# Patient Record
Sex: FEMALE | Race: BLACK OR AFRICAN AMERICAN | NOT HISPANIC OR LATINO
[De-identification: names, ages, dates, MRNs, and addresses within clinical notes are randomized per-mention and may not be internally consistent; named-entity substitution may affect disease eponyms.]

---

## 2018-02-15 ENCOUNTER — APPOINTMENT (OUTPATIENT)
Dept: PULMONOLOGY | Facility: CLINIC | Age: 68
End: 2018-02-15
Payer: MEDICARE

## 2018-02-15 VITALS
HEART RATE: 74 BPM | DIASTOLIC BLOOD PRESSURE: 82 MMHG | OXYGEN SATURATION: 97 % | SYSTOLIC BLOOD PRESSURE: 124 MMHG | BODY MASS INDEX: 31.28 KG/M2 | WEIGHT: 149 LBS | HEIGHT: 58 IN

## 2018-02-15 VITALS — RESPIRATION RATE: 16 BRPM

## 2018-02-15 DIAGNOSIS — Z86.39 PERSONAL HISTORY OF OTHER ENDOCRINE, NUTRITIONAL AND METABOLIC DISEASE: ICD-10-CM

## 2018-02-15 DIAGNOSIS — Z00.00 ENCOUNTER FOR GENERAL ADULT MEDICAL EXAMINATION W/OUT ABNORMAL FINDINGS: ICD-10-CM

## 2018-02-15 DIAGNOSIS — Z86.79 PERSONAL HISTORY OF OTHER DISEASES OF THE CIRCULATORY SYSTEM: ICD-10-CM

## 2018-02-15 DIAGNOSIS — Z87.39 PERSONAL HISTORY OF OTHER DISEASES OF THE MUSCULOSKELETAL SYSTEM AND CONNECTIVE TISSUE: ICD-10-CM

## 2018-02-15 PROCEDURE — 94727 GAS DIL/WSHOT DETER LNG VOL: CPT

## 2018-02-15 PROCEDURE — 99205 OFFICE O/P NEW HI 60 MIN: CPT | Mod: 25

## 2018-02-15 PROCEDURE — 94729 DIFFUSING CAPACITY: CPT

## 2018-02-15 PROCEDURE — 94060 EVALUATION OF WHEEZING: CPT

## 2018-02-15 PROCEDURE — 85018 HEMOGLOBIN: CPT | Mod: QW

## 2018-02-15 PROCEDURE — 94664 DEMO&/EVAL PT USE INHALER: CPT | Mod: 59

## 2018-02-15 RX ORDER — METHOTREXATE 2.5 MG/1
2.5 TABLET ORAL
Refills: 0 | Status: ACTIVE | COMMUNITY

## 2018-03-29 ENCOUNTER — APPOINTMENT (OUTPATIENT)
Dept: PULMONOLOGY | Facility: CLINIC | Age: 68
End: 2018-03-29
Payer: MEDICARE

## 2018-03-29 VITALS — HEART RATE: 75 BPM | SYSTOLIC BLOOD PRESSURE: 120 MMHG | DIASTOLIC BLOOD PRESSURE: 80 MMHG

## 2018-03-29 VITALS — WEIGHT: 152 LBS | BODY MASS INDEX: 31.77 KG/M2

## 2018-03-29 VITALS — OXYGEN SATURATION: 96 %

## 2018-03-29 DIAGNOSIS — R93.8 ABNORMAL FINDINGS ON DIAGNOSTIC IMAGING OF OTHER SPECIFIED BODY STRUCTURES: ICD-10-CM

## 2018-03-29 PROCEDURE — 94010 BREATHING CAPACITY TEST: CPT

## 2018-03-29 PROCEDURE — 99214 OFFICE O/P EST MOD 30 MIN: CPT | Mod: 25

## 2018-04-23 ENCOUNTER — RX RENEWAL (OUTPATIENT)
Age: 68
End: 2018-04-23

## 2018-06-30 ENCOUNTER — TRANSCRIPTION ENCOUNTER (OUTPATIENT)
Age: 68
End: 2018-06-30

## 2018-08-28 ENCOUNTER — EMERGENCY (EMERGENCY)
Facility: HOSPITAL | Age: 68
LOS: 1 days | Discharge: HOME | End: 2018-08-28
Attending: EMERGENCY MEDICINE
Payer: MEDICARE

## 2018-08-28 VITALS
DIASTOLIC BLOOD PRESSURE: 83 MMHG | HEART RATE: 75 BPM | SYSTOLIC BLOOD PRESSURE: 132 MMHG | TEMPERATURE: 98 F | RESPIRATION RATE: 16 BRPM | OXYGEN SATURATION: 95 %

## 2018-08-28 VITALS
RESPIRATION RATE: 16 BRPM | SYSTOLIC BLOOD PRESSURE: 128 MMHG | HEART RATE: 90 BPM | HEIGHT: 66 IN | DIASTOLIC BLOOD PRESSURE: 75 MMHG | WEIGHT: 151.9 LBS | OXYGEN SATURATION: 95 % | TEMPERATURE: 98 F

## 2018-08-28 DIAGNOSIS — Z98.89 OTHER SPECIFIED POSTPROCEDURAL STATES: Chronic | ICD-10-CM

## 2018-08-28 LAB
ALBUMIN SERPL ELPH-MCNC: 3.2 G/DL — LOW (ref 3.3–5)
ALP SERPL-CCNC: 105 U/L — SIGNIFICANT CHANGE UP (ref 40–120)
ALT FLD-CCNC: 21 U/L — SIGNIFICANT CHANGE UP (ref 12–78)
ANION GAP SERPL CALC-SCNC: 7 MMOL/L — SIGNIFICANT CHANGE UP (ref 5–17)
AST SERPL-CCNC: 23 U/L — SIGNIFICANT CHANGE UP (ref 15–37)
BASOPHILS # BLD AUTO: 0.08 K/UL — SIGNIFICANT CHANGE UP (ref 0–0.2)
BASOPHILS NFR BLD AUTO: 1.1 % — SIGNIFICANT CHANGE UP (ref 0–2)
BILIRUB SERPL-MCNC: 0.4 MG/DL — SIGNIFICANT CHANGE UP (ref 0.2–1.2)
BUN SERPL-MCNC: 18 MG/DL — SIGNIFICANT CHANGE UP (ref 7–23)
CALCIUM SERPL-MCNC: 9.2 MG/DL — SIGNIFICANT CHANGE UP (ref 8.5–10.1)
CHLORIDE SERPL-SCNC: 106 MMOL/L — SIGNIFICANT CHANGE UP (ref 96–108)
CK MB BLD-MCNC: 0.9 % — SIGNIFICANT CHANGE UP (ref 0–3.5)
CK MB CFR SERPL CALC: 1.2 NG/ML — SIGNIFICANT CHANGE UP (ref 0–3.6)
CK SERPL-CCNC: 127 U/L — SIGNIFICANT CHANGE UP (ref 26–192)
CO2 SERPL-SCNC: 27 MMOL/L — SIGNIFICANT CHANGE UP (ref 22–31)
CREAT SERPL-MCNC: 0.72 MG/DL — SIGNIFICANT CHANGE UP (ref 0.5–1.3)
D DIMER BLD IA.RAPID-MCNC: 195 NG/ML DDU — SIGNIFICANT CHANGE UP
EOSINOPHIL # BLD AUTO: 0.94 K/UL — HIGH (ref 0–0.5)
EOSINOPHIL NFR BLD AUTO: 12.7 % — HIGH (ref 0–6)
GLUCOSE SERPL-MCNC: 120 MG/DL — HIGH (ref 70–99)
HCT VFR BLD CALC: 41.8 % — SIGNIFICANT CHANGE UP (ref 34.5–45)
HGB BLD-MCNC: 14.3 G/DL — SIGNIFICANT CHANGE UP (ref 11.5–15.5)
IMM GRANULOCYTES NFR BLD AUTO: 0.1 % — SIGNIFICANT CHANGE UP (ref 0–1.5)
LYMPHOCYTES # BLD AUTO: 2.44 K/UL — SIGNIFICANT CHANGE UP (ref 1–3.3)
LYMPHOCYTES # BLD AUTO: 33 % — SIGNIFICANT CHANGE UP (ref 13–44)
MCHC RBC-ENTMCNC: 29.1 PG — SIGNIFICANT CHANGE UP (ref 27–34)
MCHC RBC-ENTMCNC: 34.2 GM/DL — SIGNIFICANT CHANGE UP (ref 32–36)
MCV RBC AUTO: 85 FL — SIGNIFICANT CHANGE UP (ref 80–100)
MONOCYTES # BLD AUTO: 0.59 K/UL — SIGNIFICANT CHANGE UP (ref 0–0.9)
MONOCYTES NFR BLD AUTO: 8 % — SIGNIFICANT CHANGE UP (ref 2–14)
NEUTROPHILS # BLD AUTO: 3.33 K/UL — SIGNIFICANT CHANGE UP (ref 1.8–7.4)
NEUTROPHILS NFR BLD AUTO: 45.1 % — SIGNIFICANT CHANGE UP (ref 43–77)
NRBC # BLD: 0 /100 WBCS — SIGNIFICANT CHANGE UP (ref 0–0)
PLATELET # BLD AUTO: 308 K/UL — SIGNIFICANT CHANGE UP (ref 150–400)
POTASSIUM SERPL-MCNC: 4.2 MMOL/L — SIGNIFICANT CHANGE UP (ref 3.5–5.3)
POTASSIUM SERPL-SCNC: 4.2 MMOL/L — SIGNIFICANT CHANGE UP (ref 3.5–5.3)
PROT SERPL-MCNC: 7.8 G/DL — SIGNIFICANT CHANGE UP (ref 6–8.3)
RBC # BLD: 4.92 M/UL — SIGNIFICANT CHANGE UP (ref 3.8–5.2)
RBC # FLD: 15.1 % — HIGH (ref 10.3–14.5)
SODIUM SERPL-SCNC: 140 MMOL/L — SIGNIFICANT CHANGE UP (ref 135–145)
TROPONIN I SERPL-MCNC: <.015 NG/ML — SIGNIFICANT CHANGE UP (ref 0.01–0.04)
WBC # BLD: 7.39 K/UL — SIGNIFICANT CHANGE UP (ref 3.8–10.5)
WBC # FLD AUTO: 7.39 K/UL — SIGNIFICANT CHANGE UP (ref 3.8–10.5)

## 2018-08-28 PROCEDURE — 96374 THER/PROPH/DIAG INJ IV PUSH: CPT

## 2018-08-28 PROCEDURE — 93010 ELECTROCARDIOGRAM REPORT: CPT

## 2018-08-28 PROCEDURE — 80053 COMPREHEN METABOLIC PANEL: CPT

## 2018-08-28 PROCEDURE — 85027 COMPLETE CBC AUTOMATED: CPT

## 2018-08-28 PROCEDURE — 85379 FIBRIN DEGRADATION QUANT: CPT

## 2018-08-28 PROCEDURE — 71046 X-RAY EXAM CHEST 2 VIEWS: CPT | Mod: 26

## 2018-08-28 PROCEDURE — 82553 CREATINE MB FRACTION: CPT

## 2018-08-28 PROCEDURE — 99285 EMERGENCY DEPT VISIT HI MDM: CPT

## 2018-08-28 PROCEDURE — 93005 ELECTROCARDIOGRAM TRACING: CPT

## 2018-08-28 PROCEDURE — 84484 ASSAY OF TROPONIN QUANT: CPT

## 2018-08-28 PROCEDURE — 99284 EMERGENCY DEPT VISIT MOD MDM: CPT | Mod: 25

## 2018-08-28 PROCEDURE — 82550 ASSAY OF CK (CPK): CPT

## 2018-08-28 PROCEDURE — 71046 X-RAY EXAM CHEST 2 VIEWS: CPT

## 2018-08-28 RX ORDER — KETOROLAC TROMETHAMINE 30 MG/ML
30 SYRINGE (ML) INJECTION ONCE
Qty: 0 | Refills: 0 | Status: DISCONTINUED | OUTPATIENT
Start: 2018-08-28 | End: 2018-08-28

## 2018-08-28 RX ORDER — ASPIRIN/CALCIUM CARB/MAGNESIUM 324 MG
325 TABLET ORAL ONCE
Qty: 0 | Refills: 0 | Status: COMPLETED | OUTPATIENT
Start: 2018-08-28 | End: 2018-08-28

## 2018-08-28 RX ORDER — SODIUM CHLORIDE 9 MG/ML
3 INJECTION INTRAMUSCULAR; INTRAVENOUS; SUBCUTANEOUS ONCE
Qty: 0 | Refills: 0 | Status: COMPLETED | OUTPATIENT
Start: 2018-08-28 | End: 2018-08-28

## 2018-08-28 RX ADMIN — Medication 30 MILLIGRAM(S): at 02:31

## 2018-08-28 RX ADMIN — Medication 30 MILLIGRAM(S): at 02:52

## 2018-08-28 RX ADMIN — SODIUM CHLORIDE 3 MILLILITER(S): 9 INJECTION INTRAMUSCULAR; INTRAVENOUS; SUBCUTANEOUS at 01:38

## 2018-08-28 RX ADMIN — Medication 325 MILLIGRAM(S): at 01:09

## 2018-08-28 NOTE — ED PROVIDER NOTE - OBJECTIVE STATEMENT
67yo female with chest pain. pt was sitting at home and developed sudden onset of chest pain, midsternal, non radiating, tight, worse with coughing, no sob, no dizziness or diaphoresis

## 2018-08-28 NOTE — ED PROVIDER NOTE - PROGRESS NOTE DETAILS
pt states that she has had this pain before, but it was controlled with methotrexate, but that was stopped due to a toe infection, so now pt is having pain which is not new. pt to get toradol, follow up with pmd, return if any sytmposm worsen

## 2018-08-28 NOTE — ED ADULT NURSE NOTE - OBJECTIVE STATEMENT
Patient has a chronic cough for many years. Tonight she reports it has become worse and c/o 6/10 pain on palpation of area just below trachea. Mild tenderness notes to area, no crepitus.

## 2019-11-20 ENCOUNTER — APPOINTMENT (OUTPATIENT)
Dept: VASCULAR SURGERY | Facility: CLINIC | Age: 69
End: 2019-11-20
Payer: MEDICARE

## 2019-11-20 VITALS
WEIGHT: 152 LBS | OXYGEN SATURATION: 96 % | DIASTOLIC BLOOD PRESSURE: 93 MMHG | BODY MASS INDEX: 30.64 KG/M2 | HEART RATE: 72 BPM | HEIGHT: 59 IN | RESPIRATION RATE: 16 BRPM | SYSTOLIC BLOOD PRESSURE: 151 MMHG

## 2019-11-20 DIAGNOSIS — Z82.49 FAMILY HISTORY OF ISCHEMIC HEART DISEASE AND OTHER DISEASES OF THE CIRCULATORY SYSTEM: ICD-10-CM

## 2019-11-20 DIAGNOSIS — Z86.39 PERSONAL HISTORY OF OTHER ENDOCRINE, NUTRITIONAL AND METABOLIC DISEASE: ICD-10-CM

## 2019-11-20 DIAGNOSIS — Z86.79 PERSONAL HISTORY OF OTHER DISEASES OF THE CIRCULATORY SYSTEM: ICD-10-CM

## 2019-11-20 DIAGNOSIS — Z82.61 FAMILY HISTORY OF ARTHRITIS: ICD-10-CM

## 2019-11-20 DIAGNOSIS — Z87.39 PERSONAL HISTORY OF OTHER DISEASES OF THE MUSCULOSKELETAL SYSTEM AND CONNECTIVE TISSUE: ICD-10-CM

## 2019-11-20 PROCEDURE — 99203 OFFICE O/P NEW LOW 30 MIN: CPT

## 2019-11-20 PROCEDURE — 93923 UPR/LXTR ART STDY 3+ LVLS: CPT

## 2019-11-25 PROBLEM — Z87.39 HISTORY OF ARTHRITIS: Status: RESOLVED | Noted: 2019-11-21 | Resolved: 2019-11-25

## 2019-11-25 PROBLEM — Z82.49 FAMILY HISTORY OF HYPERTENSION: Status: ACTIVE | Noted: 2019-11-21

## 2019-11-25 PROBLEM — Z82.61 FAMILY HISTORY OF RHEUMATOID ARTHRITIS: Status: ACTIVE | Noted: 2019-11-21

## 2019-11-25 PROBLEM — Z86.39 HISTORY OF HIGH CHOLESTEROL: Status: RESOLVED | Noted: 2019-11-21 | Resolved: 2019-11-25

## 2019-11-25 PROBLEM — Z86.79 HISTORY OF HYPERTENSION: Status: RESOLVED | Noted: 2019-11-21 | Resolved: 2019-11-25

## 2019-11-25 PROBLEM — Z82.49 FAMILY HISTORY OF MYOCARDIAL INFARCTION: Status: ACTIVE | Noted: 2019-11-21

## 2019-11-25 RX ORDER — FOLIC ACID 1 MG/1
1 TABLET ORAL
Refills: 0 | Status: ACTIVE | COMMUNITY

## 2019-11-29 ENCOUNTER — OUTPATIENT (OUTPATIENT)
Dept: OUTPATIENT SERVICES | Facility: HOSPITAL | Age: 69
LOS: 1 days | End: 2019-11-29
Payer: COMMERCIAL

## 2019-11-29 VITALS
TEMPERATURE: 98 F | DIASTOLIC BLOOD PRESSURE: 87 MMHG | HEIGHT: 60 IN | RESPIRATION RATE: 18 BRPM | HEART RATE: 74 BPM | SYSTOLIC BLOOD PRESSURE: 143 MMHG | OXYGEN SATURATION: 96 % | WEIGHT: 160.94 LBS

## 2019-11-29 DIAGNOSIS — Z98.891 HISTORY OF UTERINE SCAR FROM PREVIOUS SURGERY: Chronic | ICD-10-CM

## 2019-11-29 DIAGNOSIS — L97.509 NON-PRESSURE CHRONIC ULCER OF OTHER PART OF UNSPECIFIED FOOT WITH UNSPECIFIED SEVERITY: ICD-10-CM

## 2019-11-29 DIAGNOSIS — Z01.818 ENCOUNTER FOR OTHER PREPROCEDURAL EXAMINATION: ICD-10-CM

## 2019-11-29 DIAGNOSIS — Z98.89 OTHER SPECIFIED POSTPROCEDURAL STATES: Chronic | ICD-10-CM

## 2019-11-29 DIAGNOSIS — Z96.651 PRESENCE OF RIGHT ARTIFICIAL KNEE JOINT: Chronic | ICD-10-CM

## 2019-11-29 LAB
ANION GAP SERPL CALC-SCNC: 13 MMOL/L — SIGNIFICANT CHANGE UP (ref 5–17)
APTT BLD: 30.3 SEC — SIGNIFICANT CHANGE UP (ref 27.5–36.3)
BUN SERPL-MCNC: 17 MG/DL — SIGNIFICANT CHANGE UP (ref 8–20)
CALCIUM SERPL-MCNC: 9.2 MG/DL — SIGNIFICANT CHANGE UP (ref 8.6–10.2)
CHLORIDE SERPL-SCNC: 102 MMOL/L — SIGNIFICANT CHANGE UP (ref 98–107)
CO2 SERPL-SCNC: 24 MMOL/L — SIGNIFICANT CHANGE UP (ref 22–29)
CREAT SERPL-MCNC: 0.68 MG/DL — SIGNIFICANT CHANGE UP (ref 0.5–1.3)
GLUCOSE SERPL-MCNC: 104 MG/DL — SIGNIFICANT CHANGE UP (ref 70–115)
HCT VFR BLD CALC: 44.8 % — SIGNIFICANT CHANGE UP (ref 39–50)
HGB BLD-MCNC: 14.2 G/DL — SIGNIFICANT CHANGE UP (ref 13–17)
INR BLD: 1.06 RATIO — SIGNIFICANT CHANGE UP (ref 0.88–1.16)
MAGNESIUM SERPL-MCNC: 1.8 MG/DL — SIGNIFICANT CHANGE UP (ref 1.6–2.6)
MCHC RBC-ENTMCNC: 28.3 PG — SIGNIFICANT CHANGE UP (ref 27–34)
MCHC RBC-ENTMCNC: 31.7 GM/DL — LOW (ref 32–36)
MCV RBC AUTO: 89.2 FL — SIGNIFICANT CHANGE UP (ref 80–100)
PLATELET # BLD AUTO: 367 K/UL — SIGNIFICANT CHANGE UP (ref 150–400)
POTASSIUM SERPL-MCNC: 4.5 MMOL/L — SIGNIFICANT CHANGE UP (ref 3.5–5.3)
POTASSIUM SERPL-SCNC: 4.5 MMOL/L — SIGNIFICANT CHANGE UP (ref 3.5–5.3)
PROTHROM AB SERPL-ACNC: 12.2 SEC — SIGNIFICANT CHANGE UP (ref 10–12.9)
RBC # BLD: 5.02 M/UL — SIGNIFICANT CHANGE UP (ref 4.2–5.8)
RBC # FLD: 15 % — HIGH (ref 10.3–14.5)
SODIUM SERPL-SCNC: 139 MMOL/L — SIGNIFICANT CHANGE UP (ref 135–145)
WBC # BLD: 6.85 K/UL — SIGNIFICANT CHANGE UP (ref 3.8–10.5)
WBC # FLD AUTO: 6.85 K/UL — SIGNIFICANT CHANGE UP (ref 3.8–10.5)

## 2019-11-29 PROCEDURE — 85730 THROMBOPLASTIN TIME PARTIAL: CPT

## 2019-11-29 PROCEDURE — G0463: CPT

## 2019-11-29 PROCEDURE — 85610 PROTHROMBIN TIME: CPT

## 2019-11-29 PROCEDURE — 93010 ELECTROCARDIOGRAM REPORT: CPT

## 2019-11-29 PROCEDURE — 85027 COMPLETE CBC AUTOMATED: CPT

## 2019-11-29 PROCEDURE — 80048 BASIC METABOLIC PNL TOTAL CA: CPT

## 2019-11-29 PROCEDURE — 93005 ELECTROCARDIOGRAM TRACING: CPT

## 2019-11-29 PROCEDURE — 36415 COLL VENOUS BLD VENIPUNCTURE: CPT

## 2019-11-29 PROCEDURE — 83735 ASSAY OF MAGNESIUM: CPT

## 2019-11-29 NOTE — H&P PST ADULT - OTHER CARE PROVIDERS
Dr. Richardson ([facility] Cardiologist), Dr. Dennis Becerra (Vascular Surgeon) Dr. Richardson ([facility] Cardiologist), Dr. Dennis Becerra (Vascular Surgeon), Dr. Clark (Podiatry)

## 2019-11-29 NOTE — H&P PST ADULT - HISTORY OF PRESENT ILLNESS
Narrative: This is a 70 y/o F, never smoker, with a PMHx of HTN, HLD, Bronchiectasis s/p 9/11 exposure, PAD, RA (on methotrexate and prednisone) with a nonhealing right great toe ulcer presents today to PST in anticipation of a right lower extremity angiogram with Dr. Walter. She reports her ulcer has been present for >5 months and has been treated per podiatry and wound care.  She has also had a previous RLE angiogram with Dr. Griggs; there was no intervention.  She is complaining of significant toe pain at the site of her ulcer and endorses intermittent claudication bilaterally.   She denies any history of diabetes.  She endorses daily compliance with asa, statin and antihypertensives.     Lower arterial Exam 11/20/2019  MANDI/PVR demonstrated R MANDI 0.62 and TBI 0.33 with toe pressure of 51 (ankle pressure of 96)  L MANDI 0.65 and TBI 0.22 with toe pressure of 34  Impression: blunted waveforms with monophasic appearance noted in the lower extremity. Moderate decrease in pressure and abnormal ankle brachial index suggestive of arterial occlusive disease consistent with arterial claudication.    ASA: 2  Mallampati:  Bleeding Risk:  Creatinine:  GFR: Narrative: This is a 70 y/o F, never smoker, with a PMHx of HTN, HLD, Bronchiectasis s/p 9/11 exposure, PAD, RA (on methotrexate) with a nonhealing right great toe ulcer presents today to PST in anticipation of a right lower extremity angiogram with Dr. Walter. She reports her ulcer has been present for >5 months and has been treated per podiatry and wound care.  She has also had a previous RLE angiogram with Dr. Griggs; there was no intervention.  She is complaining of significant toe pain at the site of her ulcer and endorses intermittent claudication bilaterally.   She denies any history of diabetes; reports her last hemoglobin a1c was normal and was done at her cardiologist's office.  She endorses daily compliance with asa, statin and antihypertensives.     Lower arterial Exam 11/20/2019  MANDI/PVR demonstrated R MANDI 0.62 and TBI 0.33 with toe pressure of 51 (ankle pressure of 96)  L MANDI 0.65 and TBI 0.22 with toe pressure of 34  Impression: blunted waveforms with monophasic appearance noted in the lower extremity. Moderate decrease in pressure and abnormal ankle brachial index suggestive of arterial occlusive disease consistent with arterial claudication.    ASA: 2  Mallampati: 2  Bleeding Risk: 1.6%  Creatinine: 0.68  GFR: 114 Narrative: This is a 68 y/o F, never smoker, with a PMHx of HTN, HLD, Bronchiectasis s/p 9/11 exposure, PAD, RA (on methotrexate) with a nonhealing right great toe ulcer presents today to PST in anticipation of a right lower extremity angiogram with Dr. Walter. She reports her ulcer has been present for >5 months and has been treated per podiatry and wound care.  She has also had a previous RLE angiogram with Dr. Griggs (report requested); she had a POBA (unknown vessel) per pt and daughter (Dr. Patricia Carvajal).  She is complaining of significant toe pain at the site of her ulcer and endorses intermittent claudication bilaterally.   She denies any history of diabetes; reports her last hemoglobin a1c was normal and was done at her cardiologist's office.  She endorses daily compliance with asa, statin and antihypertensives.     Lower arterial Exam 11/20/2019  MANDI/PVR demonstrated R MANDI 0.62 and TBI 0.33 with toe pressure of 51 (ankle pressure of 96)  L MANDI 0.65 and TBI 0.22 with toe pressure of 34  Impression: blunted waveforms with monophasic appearance noted in the lower extremity. Moderate decrease in pressure and abnormal ankle brachial index suggestive of arterial occlusive disease consistent with arterial claudication.    ASA: 2  Mallampati: 2  Bleeding Risk: 1.6%  Creatinine: 0.68  GFR: 114 Narrative: This is a 68 y/o F, never smoker, with a PMHx of HTN, HLD, Bronchiectasis s/p 9/11 exposure, PAD, RA (on methotrexate) with a nonhealing right great toe ulcer presents today to PST in anticipation of a right lower extremity angiogram with Dr. Walter. She reports her ulcer has been present for >5 months and has been treated per podiatry and wound care (iodoform and other ointment; she does not recall).  She has also had a previous RLE angiogram with Dr. Griggs (report requested); she had a POBA (unknown vessel) per pt and daughter (Dr. Patricia Carvajal).  She is complaining of significant toe pain at the site of her ulcer and endorses intermittent claudication bilaterally.   She denies any history of diabetes; reports her last hemoglobin a1c was normal and was done at her cardiologist's office.  She endorses daily compliance with asa, statin and antihypertensives.     Lower arterial Exam 11/20/2019  MANDI/PVR demonstrated R MANDI 0.62 and TBI 0.33 with toe pressure of 51 (ankle pressure of 96)  L MANDI 0.65 and TBI 0.22 with toe pressure of 34  Impression: blunted waveforms with monophasic appearance noted in the lower extremity. Moderate decrease in pressure and abnormal ankle brachial index suggestive of arterial occlusive disease consistent with arterial claudication.    ASA: 2  Mallampati: 2  Bleeding Risk: 1.6%  Creatinine: 0.68  GFR: 114

## 2019-11-29 NOTE — H&P PST ADULT - NEGATIVE NEUROLOGICAL SYMPTOMS
no syncope/no headache/no weakness/no paresthesias/no generalized seizures/no loss of sensation/no transient paralysis/no focal seizures/no tremors/no vertigo

## 2019-11-29 NOTE — ASU PATIENT PROFILE, ADULT - PMH
Chronic ulcer of toe  right great toe  Claudication    H/O bronchiectasis    HLD (hyperlipidemia)    HTN (hypertension)    PAD (peripheral artery disease)    Rheumatoid arthritis

## 2019-11-29 NOTE — H&P PST ADULT - ASSESSMENT
70 y/o F with a PMHx HTN, HLD, RA with nonhealing R toe ulcer presents in anticipation of a RLE angiogram with possible intervention to treat critical limb ischemia.

## 2019-11-29 NOTE — H&P PST ADULT - NSICDXPASTMEDICALHX_GEN_ALL_CORE_FT
PAST MEDICAL HISTORY:  Chronic ulcer of toe right great toe    Claudication     H/O bronchiectasis     HLD (hyperlipidemia)     HTN (hypertension)     PAD (peripheral artery disease)     Rheumatoid arthritis

## 2019-12-05 ENCOUNTER — TRANSCRIPTION ENCOUNTER (OUTPATIENT)
Age: 69
End: 2019-12-05

## 2019-12-05 ENCOUNTER — OUTPATIENT (OUTPATIENT)
Dept: OUTPATIENT SERVICES | Facility: HOSPITAL | Age: 69
LOS: 1 days | End: 2019-12-05
Payer: COMMERCIAL

## 2019-12-05 VITALS
SYSTOLIC BLOOD PRESSURE: 126 MMHG | DIASTOLIC BLOOD PRESSURE: 79 MMHG | OXYGEN SATURATION: 98 % | RESPIRATION RATE: 16 BRPM | HEART RATE: 79 BPM

## 2019-12-05 VITALS
DIASTOLIC BLOOD PRESSURE: 67 MMHG | HEART RATE: 80 BPM | RESPIRATION RATE: 17 BRPM | OXYGEN SATURATION: 95 % | SYSTOLIC BLOOD PRESSURE: 117 MMHG

## 2019-12-05 DIAGNOSIS — Z98.89 OTHER SPECIFIED POSTPROCEDURAL STATES: Chronic | ICD-10-CM

## 2019-12-05 DIAGNOSIS — Z96.651 PRESENCE OF RIGHT ARTIFICIAL KNEE JOINT: Chronic | ICD-10-CM

## 2019-12-05 DIAGNOSIS — R07.9 CHEST PAIN, UNSPECIFIED: ICD-10-CM

## 2019-12-05 DIAGNOSIS — Z98.891 HISTORY OF UTERINE SCAR FROM PREVIOUS SURGERY: Chronic | ICD-10-CM

## 2019-12-05 DIAGNOSIS — R06.9 UNSPECIFIED ABNORMALITIES OF BREATHING: ICD-10-CM

## 2019-12-05 PROCEDURE — 75710 ARTERY X-RAYS ARM/LEG: CPT | Mod: XU

## 2019-12-05 PROCEDURE — C1725: CPT

## 2019-12-05 PROCEDURE — 99152 MOD SED SAME PHYS/QHP 5/>YRS: CPT

## 2019-12-05 PROCEDURE — 37230: CPT

## 2019-12-05 PROCEDURE — C1760: CPT

## 2019-12-05 PROCEDURE — 36247 INS CATH ABD/L-EXT ART 3RD: CPT | Mod: 59

## 2019-12-05 PROCEDURE — C1887: CPT

## 2019-12-05 PROCEDURE — 76937 US GUIDE VASCULAR ACCESS: CPT | Mod: 26

## 2019-12-05 PROCEDURE — C1874: CPT

## 2019-12-05 PROCEDURE — 37230: CPT | Mod: RT

## 2019-12-05 PROCEDURE — 75625 CONTRAST EXAM ABDOMINL AORTA: CPT | Mod: 26

## 2019-12-05 PROCEDURE — 99153 MOD SED SAME PHYS/QHP EA: CPT

## 2019-12-05 PROCEDURE — C1894: CPT

## 2019-12-05 PROCEDURE — 75710 ARTERY X-RAYS ARM/LEG: CPT | Mod: 26,59

## 2019-12-05 PROCEDURE — C1769: CPT

## 2019-12-05 RX ORDER — ASPIRIN/CALCIUM CARB/MAGNESIUM 324 MG
81 TABLET ORAL ONCE
Refills: 0 | Status: DISCONTINUED | OUTPATIENT
Start: 2019-12-05 | End: 2019-12-30

## 2019-12-05 RX ORDER — CLOPIDOGREL BISULFATE 75 MG/1
1 TABLET, FILM COATED ORAL
Qty: 90 | Refills: 0
Start: 2019-12-05

## 2019-12-05 RX ORDER — SODIUM CHLORIDE 9 MG/ML
1000 INJECTION INTRAMUSCULAR; INTRAVENOUS; SUBCUTANEOUS
Refills: 0 | Status: DISCONTINUED | OUTPATIENT
Start: 2019-12-05 | End: 2019-12-30

## 2019-12-05 RX ORDER — ACETAMINOPHEN 500 MG
1000 TABLET ORAL ONCE
Refills: 0 | Status: COMPLETED | OUTPATIENT
Start: 2019-12-05 | End: 2019-12-05

## 2019-12-05 RX ORDER — CLOPIDOGREL BISULFATE 75 MG/1
75 TABLET, FILM COATED ORAL DAILY
Refills: 0 | Status: DISCONTINUED | OUTPATIENT
Start: 2019-12-06 | End: 2019-12-30

## 2019-12-05 RX ADMIN — Medication 400 MILLIGRAM(S): at 20:06

## 2019-12-05 NOTE — DISCHARGE NOTE PROVIDER - NSDCCPTREATMENT_GEN_ALL_CORE_FT
PRINCIPAL PROCEDURE  Procedure: Angioplasty, artery, peripheral  Findings and Treatment: stent to tib peroneal trunk

## 2019-12-05 NOTE — BRIEF OPERATIVE NOTE - NSICDXBRIEFPROCEDURE_GEN_ALL_CORE_FT
PROCEDURES:  Angio fluoro tibial artery using contrast w insertion of stent 05-Dec-2019 16:43:17  Juan Pablo Walden

## 2019-12-05 NOTE — DISCHARGE NOTE PROVIDER - HOSPITAL COURSE
70 y/o F, never smoker, with a PMHx of HTN, HLD, Bronchiectasis s/p 9/11 exposure, PAD, RA (on methotrexate) with a nonhealing right great toe ulcer presents today to PST in anticipation of a right lower extremity angiogram with Dr. Walter. She reports her ulcer has been present for >5 months and has been treated per podiatry and wound care (iodoform and other ointment; she does not recall).  She has also had a previous RLE angiogram with Dr. Griggs (report requested); she had a POBA (unknown vessel) per pt and daughter (Dr. Wellsica Alena).  She is complaining of significant toe pain at the site of her ulcer and endorses intermittent claudication bilaterally. Now S/P LE peripheral angiography Right tibial peroneal trunk stent, via LFA with mynx closure device and right pedal access with band applied. Needs bypass to PT in future.             Neuro: A&OX3    Lungs: CTA B/L    CV: S1, S2, no murmur, RRR    Abd: Soft    left Groin: Soft, no bleeding, no hematoma    Right pedal no bleeding, no hematoma, no ecchymosis    Extremity: + distal pulses            A/P: 69y female s/p LE peripheral angio with stent to tib peroneal trunk     1. Groin management discussed with patient    2. Continue current meds    3. Follow up as an outpatient with cardiologist    4. Add Plavix 75mg po daily    5, Bedrest x 4 hours    6. Discharge today if groin and pedal access site stable

## 2019-12-05 NOTE — DISCHARGE NOTE PROVIDER - NSDCCPCAREPLAN_GEN_ALL_CORE_FT
PRINCIPAL DISCHARGE DIAGNOSIS  Diagnosis: Peripheral arterial disease  Assessment and Plan of Treatment: S/P tib peroneal trunk stent

## 2019-12-05 NOTE — DISCHARGE NOTE PROVIDER - NSDCACTIVITY_GEN_ALL_CORE
Do not drive or operate machinery/Walking - Outdoors allowed/Walking - Indoors allowed/No heavy lifting/straining/Showering allowed

## 2019-12-05 NOTE — DISCHARGE NOTE NURSING/CASE MANAGEMENT/SOCIAL WORK - PATIENT PORTAL LINK FT
You can access the FollowMyHealth Patient Portal offered by [facility] by registering at the following website: [email or web address]. By joining [facility]’s FollowMyHealth portal, you will also be able to view your health information using other applications (apps) compatible with our system.

## 2019-12-05 NOTE — PROGRESS NOTE ADULT - SUBJECTIVE AND OBJECTIVE BOX
Progress Note Adult-Cardiology NP [Charted Location: [facility] Cath Lab] [Authored: 05-Dec-2019 09:48]- for Visit: [id number], Complete, Entered, Signed in Full, General    Progress Note:   · Provider Specialty	Cardiology      · Subjective and Objective:   Interventional Cardiology NP Precath Note      HPI: 68 y/o F, never smoker, with a PMHx of HTN, HLD, Bronchiectasis s/p  exposure, PAD, RA (on methotrexate) with a nonhealing right great toe ulcer presents today to PST in anticipation of a right lower extremity angiogram with Dr. Walter. She reports her ulcer has been present for >5 months and has been treated per podiatry and wound care (iodoform and other ointment; she does not recall).  She has also had a previous RLE angiogram with Dr. Griggs (report requested); she had a POBA (unknown vessel) per pt and daughter (Dr. Patricia Carvajal).  She is complaining of significant toe pain at the site of her ulcer and endorses intermittent claudication bilaterally.   She denies any history of diabetes; reports her last hemoglobin a1c was normal and was done at her cardiologist's office.  She endorses daily compliance with asa, statin and antihypertensives.           ALL: NKDA, NKFA. Denies shellfish/Contrast dye allergy.  PAST MEDICAL & SURGICAL HISTORY:  Claudication  H/O bronchiectasis  HLD (hyperlipidemia)  HTN (hypertension)  Chronic ulcer of toe: right great toe  PAD (peripheral artery disease)  Rheumatoid arthritis  S/P : x2  S/P total knee replacement, right:     SocHX: Denies EtoH/TOB/IVDU    MEDS:   aspirin  chewable 81 milliGRAM(s) Oral once    T(C): --  HR: 79 (19 @ 08:35) (79 - 79)  BP: 126/79 (19 @ 08:35) (126/79 - 126/79)  RR: 16 (19 @ 08:35) (16 - 16)  SpO2: 98% (19 @ 08:35) (98% - 98%)  Wt(kg): --      			  A/P:  68 yo female with claudication for LE Angio  -Proceed with procedure as planned	        Risks & benefits of procedure and sedation and risks and benefits for the alternative therapy have been explained to the patient in layman’s terms including but not limited to: allergic reaction, bleeding, infection, arrhythmia, respiratory compromise, renal and vascular compromise, limb damage, MI, CVA, emergent CABG/Vascular Surgery and death. Informed consent obtained and in chart by MD.      Electronic Signatures:  Courtney Patton ([email or web address])  (Signed 05-Dec-2019 09:55)  	Authored: Progress Note, Subjective and Objective      Last Updated: 05-Dec-2019 09:55 by Courtney Patton ([email or web address])

## 2019-12-05 NOTE — PROGRESS NOTE ADULT - SUBJECTIVE AND OBJECTIVE BOX
Interventional Cardiology NP Precath Note      HPI: 70 y/o F, never smoker, with a PMHx of HTN, HLD, Bronchiectasis s/p  exposure, PAD, RA (on methotrexate) with a nonhealing right great toe ulcer presents today to PST in anticipation of a right lower extremity angiogram with Dr. Walter. She reports her ulcer has been present for >5 months and has been treated per podiatry and wound care (iodoform and other ointment; she does not recall).  She has also had a previous RLE angiogram with Dr. Griggs (report requested); she had a POBA (unknown vessel) per pt and daughter (Dr. Patricia Carvajal).  She is complaining of significant toe pain at the site of her ulcer and endorses intermittent claudication bilaterally.   She denies any history of diabetes; reports her last hemoglobin a1c was normal and was done at her cardiologist's office.  She endorses daily compliance with asa, statin and antihypertensives.           ALL: NKDA, NKFA. Denies shellfish/Contrast dye allergy.  PAST MEDICAL & SURGICAL HISTORY:  Claudication  H/O bronchiectasis  HLD (hyperlipidemia)  HTN (hypertension)  Chronic ulcer of toe: right great toe  PAD (peripheral artery disease)  Rheumatoid arthritis  S/P : x2  S/P total knee replacement, right:     SocHX: Denies EtoH/TOB/IVDU    MEDS:   aspirin  chewable 81 milliGRAM(s) Oral once    T(C): --  HR: 79 (19 @ 08:35) (79 - 79)  BP: 126/79 (19 @ 08:35) (126/79 - 126/79)  RR: 16 (19 @ 08:35) (16 - 16)  SpO2: 98% (19 @ 08:35) (98% - 98%)  Wt(kg): --      			  A/P:  68 yo female with claudication for LE Angio  -Proceed with procedure as planned	        Risks & benefits of procedure and sedation and risks and benefits for the alternative therapy have been explained to the patient in layman’s terms including but not limited to: allergic reaction, bleeding, infection, arrhythmia, respiratory compromise, renal and vascular compromise, limb damage, MI, CVA, emergent CABG/Vascular Surgery and death. Informed consent obtained and in chart by MD.

## 2019-12-05 NOTE — DISCHARGE NOTE PROVIDER - CARE PROVIDER_API CALL
ManvarSingh, Pallavi B (MD)  Vascular Surgery  [address], [address] [place] [address]  Phone: [phone]  Fax: [phone]  Follow Up Time: 2 weeks

## 2019-12-05 NOTE — DISCHARGE NOTE PROVIDER - NSDCMRMEDTOKEN_GEN_ALL_CORE_FT
amLODIPine 5 mg oral tablet: 1 tab(s) orally once a day  aspirin 81 mg oral tablet: 1 tab(s) orally once a day  Calcium 600+D oral tablet: 1 tab(s) orally once a day  clopidogrel 75 mg oral tablet: 1 tab(s) orally once a day  Cod Liver Oil oral capsule: 1 cap(s) orally 2 times a day  Fish Oil 1000 mg oral capsule: 1 cap(s) orally once a day  folic acid 1 mg oral tablet: 1 tab(s) orally once a day  ibuprofen 200 mg oral tablet: 1 tab(s) orally once a day, As Needed  losartan-hydrochlorothiazide 50mg-12.5mg oral tablet: 1 tab(s) orally once a day  methotrexate 2.5 mg oral tablet: 6 tab(s) orally every 7 days  Multiple Vitamins oral tablet: 1 tab(s) orally once a day  Protonix 40 mg oral delayed release tablet: 1 tab(s) orally once a day  simvastatin 40 mg oral tablet: 1 tab(s) orally once a day (at bedtime)  Vitamin D3 1000 intl units oral tablet: 1 tab(s) orally once a day

## 2019-12-20 ENCOUNTER — APPOINTMENT (OUTPATIENT)
Dept: VASCULAR SURGERY | Facility: CLINIC | Age: 69
End: 2019-12-20
Payer: MEDICARE

## 2019-12-20 ENCOUNTER — OUTPATIENT (OUTPATIENT)
Dept: OUTPATIENT SERVICES | Facility: HOSPITAL | Age: 69
LOS: 1 days | End: 2019-12-20
Payer: COMMERCIAL

## 2019-12-20 VITALS
DIASTOLIC BLOOD PRESSURE: 90 MMHG | SYSTOLIC BLOOD PRESSURE: 130 MMHG | RESPIRATION RATE: 18 BRPM | HEART RATE: 60 BPM | WEIGHT: 162.04 LBS | HEIGHT: 56 IN | TEMPERATURE: 98 F

## 2019-12-20 DIAGNOSIS — Z01.818 ENCOUNTER FOR OTHER PREPROCEDURAL EXAMINATION: ICD-10-CM

## 2019-12-20 DIAGNOSIS — Z29.9 ENCOUNTER FOR PROPHYLACTIC MEASURES, UNSPECIFIED: ICD-10-CM

## 2019-12-20 DIAGNOSIS — I73.9 PERIPHERAL VASCULAR DISEASE, UNSPECIFIED: ICD-10-CM

## 2019-12-20 DIAGNOSIS — Z98.891 HISTORY OF UTERINE SCAR FROM PREVIOUS SURGERY: Chronic | ICD-10-CM

## 2019-12-20 DIAGNOSIS — Z98.89 OTHER SPECIFIED POSTPROCEDURAL STATES: Chronic | ICD-10-CM

## 2019-12-20 DIAGNOSIS — Z96.651 PRESENCE OF RIGHT ARTIFICIAL KNEE JOINT: Chronic | ICD-10-CM

## 2019-12-20 LAB
ANION GAP SERPL CALC-SCNC: 12 MMOL/L — SIGNIFICANT CHANGE UP (ref 5–17)
APTT BLD: 31.3 SEC — SIGNIFICANT CHANGE UP (ref 27.5–36.3)
BASOPHILS # BLD AUTO: 0.06 K/UL — SIGNIFICANT CHANGE UP (ref 0–0.2)
BASOPHILS NFR BLD AUTO: 1 % — SIGNIFICANT CHANGE UP (ref 0–2)
BLD GP AB SCN SERPL QL: SIGNIFICANT CHANGE UP
BUN SERPL-MCNC: 13 MG/DL — SIGNIFICANT CHANGE UP (ref 8–20)
CALCIUM SERPL-MCNC: 9.8 MG/DL — SIGNIFICANT CHANGE UP (ref 8.6–10.2)
CHLORIDE SERPL-SCNC: 99 MMOL/L — SIGNIFICANT CHANGE UP (ref 98–107)
CO2 SERPL-SCNC: 28 MMOL/L — SIGNIFICANT CHANGE UP (ref 22–29)
CREAT SERPL-MCNC: 0.53 MG/DL — SIGNIFICANT CHANGE UP (ref 0.5–1.3)
EOSINOPHIL # BLD AUTO: 0.25 K/UL — SIGNIFICANT CHANGE UP (ref 0–0.5)
EOSINOPHIL NFR BLD AUTO: 4.3 % — SIGNIFICANT CHANGE UP (ref 0–6)
GLUCOSE SERPL-MCNC: 103 MG/DL — SIGNIFICANT CHANGE UP (ref 70–115)
HBA1C BLD-MCNC: 5.7 % — HIGH (ref 4–5.6)
HCT VFR BLD CALC: 45.4 % — HIGH (ref 34.5–45)
HGB BLD-MCNC: 14.8 G/DL — SIGNIFICANT CHANGE UP (ref 11.5–15.5)
IMM GRANULOCYTES NFR BLD AUTO: 0.2 % — SIGNIFICANT CHANGE UP (ref 0–1.5)
INR BLD: 1.06 RATIO — SIGNIFICANT CHANGE UP (ref 0.88–1.16)
LYMPHOCYTES # BLD AUTO: 1.56 K/UL — SIGNIFICANT CHANGE UP (ref 1–3.3)
LYMPHOCYTES # BLD AUTO: 27 % — SIGNIFICANT CHANGE UP (ref 13–44)
MCHC RBC-ENTMCNC: 29.6 PG — SIGNIFICANT CHANGE UP (ref 27–34)
MCHC RBC-ENTMCNC: 32.6 GM/DL — SIGNIFICANT CHANGE UP (ref 32–36)
MCV RBC AUTO: 90.8 FL — SIGNIFICANT CHANGE UP (ref 80–100)
MONOCYTES # BLD AUTO: 0.48 K/UL — SIGNIFICANT CHANGE UP (ref 0–0.9)
MONOCYTES NFR BLD AUTO: 8.3 % — SIGNIFICANT CHANGE UP (ref 2–14)
NEUTROPHILS # BLD AUTO: 3.41 K/UL — SIGNIFICANT CHANGE UP (ref 1.8–7.4)
NEUTROPHILS NFR BLD AUTO: 59.2 % — SIGNIFICANT CHANGE UP (ref 43–77)
PLATELET # BLD AUTO: 387 K/UL — SIGNIFICANT CHANGE UP (ref 150–400)
POTASSIUM SERPL-MCNC: 4.2 MMOL/L — SIGNIFICANT CHANGE UP (ref 3.5–5.3)
POTASSIUM SERPL-SCNC: 4.2 MMOL/L — SIGNIFICANT CHANGE UP (ref 3.5–5.3)
PROTHROM AB SERPL-ACNC: 12.2 SEC — SIGNIFICANT CHANGE UP (ref 10–12.9)
RBC # BLD: 5 M/UL — SIGNIFICANT CHANGE UP (ref 3.8–5.2)
RBC # FLD: 15.3 % — HIGH (ref 10.3–14.5)
SODIUM SERPL-SCNC: 139 MMOL/L — SIGNIFICANT CHANGE UP (ref 135–145)
WBC # BLD: 5.77 K/UL — SIGNIFICANT CHANGE UP (ref 3.8–10.5)
WBC # FLD AUTO: 5.77 K/UL — SIGNIFICANT CHANGE UP (ref 3.8–10.5)

## 2019-12-20 PROCEDURE — G0365: CPT

## 2019-12-20 PROCEDURE — 93010 ELECTROCARDIOGRAM REPORT: CPT

## 2019-12-20 RX ORDER — CEFAZOLIN SODIUM 1 G
2000 VIAL (EA) INJECTION ONCE
Refills: 0 | Status: COMPLETED | OUTPATIENT
Start: 2020-01-03 | End: 2020-01-03

## 2019-12-20 RX ORDER — SODIUM CHLORIDE 9 MG/ML
3 INJECTION INTRAMUSCULAR; INTRAVENOUS; SUBCUTANEOUS EVERY 8 HOURS
Refills: 0 | Status: DISCONTINUED | OUTPATIENT
Start: 2020-01-06 | End: 2020-01-07

## 2019-12-20 NOTE — H&P PST ADULT - HISTORY OF PRESENT ILLNESS
11  Hospital Course   68 y/o F, never smoker, with a PMHx of HTN, HLD, Bronchiectasis s/p 9/11  exposure, PAD, RA (on methotrexate) with a nonhealing right great toe ulcer  presents today to PST in anticipation of a right lower extremity angiogram with  Dr. Walter. She reports her ulcer has been present for >5 months and has  been treated per podiatry and wound care (iodoform and other ointment; she does  not recall). She has also had a previous RLE angiogram with Dr. Griggs (report  requested); she had a POBA (unknown vessel) per pt and daughter (Dr. Patricia Carvajal). She is complaining of significant toe pain at the site of her ulcer  and endorses intermittent claudication bilaterally. Now S/P LE peripheral  angiography Right tibial peroneal trunk stent, via LFA with mynx closure device  and right pedal access with band applied. Needs bypass to PT in future. Patient is a 68 y/o female aox3,patient  c/o right great toe pain , patient has a nonhealing ulcer on her right great toe . Patient reports that she has had this ulcer for about 6 months and has had treatments with podiatry and wound care . She has also had previous RLE angiogram .Patient state that her pain level ranges from 2/10 to 10/10 with pain relief from rest .Patient does use a cane for balance while walking . Patient presents to PST for evaluation for a right femoral posterior tibial artery with saphenous vein with possible popliteal posterior tibial artery bypass with Dr Aguilar on 01/03/20

## 2019-12-20 NOTE — H&P PST ADULT - NSANTHOSAYNRD_GEN_A_CORE
No. ALEKSANDAR screening performed.  STOP BANG Legend: 0-2 = LOW Risk; 3-4 = INTERMEDIATE Risk; 5-8 = HIGH Risk

## 2019-12-20 NOTE — H&P PST ADULT - ASSESSMENT
OPIOID RISK TOOL    ANNITA EACH BOX THAT APPLIES AND ADD TOTALS AT THE END    FAMILY HISTORY OF SUBSTANCE ABUSE                 FEMALE         MALE                                                Alcohol                             [  ]1 pt          [  ]3pts                                               Illegal Drugs                     [  ]2 pts        [  ]3pts                                               Rx Drugs                           [  ]4 pts        [  ]4 pts    PERSONAL HISTORY OF SUBSTANCE ABUSE                                                                                          Alcohol                             [  ]3 pts       [  ]3 pts                                               Illegal Drugs                     [  ]4 pts        [  ]4 pts                                               Rx Drugs                           [  ]5 pts        [  ]5 pts    AGE BETWEEN 16-45 YEARS                                      [  ]1 pt         [  ]1 pt    HISTORY OF PREADOLESCENT   SEXUAL ABUSE                                                             [  ]3 pts        [  ]0pts    PSYCHOLOGICAL DISEASE                     ADD, OCD, Bipolar, Schizophrenia        [  ]2 pts         [  ]2 pts                      Depression                                               [  ]1 pt           [  ]1 pt           SCORING TOTAL   (add numbers and type here)              (*0**)                                     A score of 3 or lower indicated LOW risk for future opioid abuse  A score of 4 to 7 indicated moderate risk for future opioid abuse  A score of 8 or higher indicates a high risk for opioid abuse    CAPRINI VTE 2.0 SCORE [CLOT updated 2019]    AGE RELATED RISK FACTORS                                                       MOBILITY RELATED FACTORS  [ ] Age 41-60 years                                            (1 Point)                    [ ] Bed rest                                                        (1 Point)  [x ] Age: 61-74 years                                           (2 Points)                  [ ] Plaster cast                                                   (2 Points)  [ ] Age= 75 years                                              (3 Points)                    [ ] Bed bound for more than 72 hours                 (2 Points)    DISEASE RELATED RISK FACTORS                                               GENDER SPECIFIC FACTORS  [ ] Edema in the lower extremities                       (1 Point)              [ ] Pregnancy                                                     (1 Point)  [ ] Varicose veins                                               (1 Point)                     [ ] Post-partum < 6 weeks                                   (1 Point)             [x ] BMI > 25 Kg/m2                                            (1 Point)                     [ ] Hormonal therapy  or oral contraception          (1 Point)                 [ ] Sepsis (in the previous month)                        (1 Point)               [ ] History of pregnancy complications                 (1 point)  [ ] Pneumonia or serious lung disease                                               [ ] Unexplained or recurrent                     (1 Point)           (in the previous month)                               (1 Point)  [ ] Abnormal pulmonary function test                     (1 Point)                 SURGERY RELATED RISK FACTORS  [ ] Acute myocardial infarction                              (1 Point)               [ ]  Section                                             (1 Point)  [ ] Congestive heart failure (in the previous month)  (1 Point)      [ ] Minor surgery                                                  (1 Point)   [ ] Inflammatory bowel disease                             (1 Point)               [ ] Arthroscopic surgery                                        (2 Points)  [ ] Central venous access                                      (2 Points)                [x ] General surgery lasting more than 45 minutes (2 points)  [ ] Malignancy- Present or previous                   (2 Points)                [ ] Elective arthroplasty                                         (5 points)    [ ] Stroke (in the previous month)                          (5 Points)                                                                                                                                                           HEMATOLOGY RELATED FACTORS                                                 TRAUMA RELATED RISK FACTORS  [ ] Prior episodes of VTE                                     (3 Points)                [ ] Fracture of the hip, pelvis, or leg                       (5 Points)  [ ] Positive family history for VTE                         (3 Points)             [ ] Acute spinal cord injury (in the previous month)  (5 Points)  [ ] Prothrombin 74951 A                                     (3 Points)               [ ] Paralysis  (less than 1 month)                             (5 Points)  [ ] Factor V Leiden                                             (3 Points)                  [ ] Multiple Trauma within 1 month                        (5 Points)  [ ] Lupus anticoagulants                                     (3 Points)                                                           [ ] Anticardiolipin antibodies                               (3 Points)                                                       [ ] High homocysteine in the blood                      (3 Points)                                             [ ] Other congenital or acquired thrombophilia      (3 Points)                                                [ ] Heparin induced thrombocytopenia                  (3 Points)                                     Total Score [     5     ] OPIOID RISK TOOL    ANNITA EACH BOX THAT APPLIES AND ADD TOTALS AT THE END    FAMILY HISTORY OF SUBSTANCE ABUSE                 FEMALE         MALE                                                Alcohol                             [  ]1 pt          [  ]3pts                                               Illegal Drugs                     [  ]2 pts        [  ]3pts                                               Rx Drugs                           [  ]4 pts        [  ]4 pts    PERSONAL HISTORY OF SUBSTANCE ABUSE                                                                                          Alcohol                             [  ]3 pts       [  ]3 pts                                               Illegal Drugs                     [  ]4 pts        [  ]4 pts                                               Rx Drugs                           [  ]5 pts        [  ]5 pts    AGE BETWEEN 16-45 YEARS                                      [  ]1 pt         [  ]1 pt    HISTORY OF PREADOLESCENT   SEXUAL ABUSE                                                             [  ]3 pts        [  ]0pts    PSYCHOLOGICAL DISEASE                     ADD, OCD, Bipolar, Schizophrenia        [  ]2 pts         [  ]2 pts                      Depression                                               [  ]1 pt           [  ]1 pt           SCORING TOTAL   (add numbers and type here)              (*0**)                                     A score of 3 or lower indicated LOW risk for future opioid abuse  A score of 4 to 7 indicated moderate risk for future opioid abuse  A score of 8 or higher indicates a high risk for opioid abuse    CAPRINI VTE 2.0 SCORE [CLOT updated 2019]    AGE RELATED RISK FACTORS                                                       MOBILITY RELATED FACTORS  [ ] Age 41-60 years                                            (1 Point)                    [ ] Bed rest                                                        (1 Point)  [x ] Age: 61-74 years                                           (2 Points)                  [ ] Plaster cast                                                   (2 Points)  [ ] Age= 75 years                                              (3 Points)                    [ ] Bed bound for more than 72 hours                 (2 Points)    DISEASE RELATED RISK FACTORS                                               GENDER SPECIFIC FACTORS  [ ] Edema in the lower extremities                       (1 Point)              [ ] Pregnancy                                                     (1 Point)  [ ] Varicose veins                                               (1 Point)                     [ ] Post-partum < 6 weeks                                   (1 Point)             [x ] BMI > 25 Kg/m2                                            (1 Point)                     [ ] Hormonal therapy  or oral contraception          (1 Point)                 [ ] Sepsis (in the previous month)                        (1 Point)               [ ] History of pregnancy complications                 (1 point)  [ ] Pneumonia or serious lung disease                                               [ ] Unexplained or recurrent                     (1 Point)           (in the previous month)                               (1 Point)  [ ] Abnormal pulmonary function test                     (1 Point)                 SURGERY RELATED RISK FACTORS  [ ] Acute myocardial infarction                              (1 Point)               [ ]  Section                                             (1 Point)  [ ] Congestive heart failure (in the previous month)  (1 Point)      [ ] Minor surgery                                                  (1 Point)   [ ] Inflammatory bowel disease                             (1 Point)               [ ] Arthroscopic surgery                                        (2 Points)  [ ] Central venous access                                      (2 Points)                [x ] General surgery lasting more than 45 minutes (2 points)  [ ] Malignancy- Present or previous                   (2 Points)                [ ] Elective arthroplasty                                         (5 points)    [ ] Stroke (in the previous month)                          (5 Points)                                                                                                                                                           HEMATOLOGY RELATED FACTORS                                                 TRAUMA RELATED RISK FACTORS  [ x] Prior episodes of VTE                                     (3 Points)                [ ] Fracture of the hip, pelvis, or leg                       (5 Points)  [ ] Positive family history for VTE                         (3 Points)             [ ] Acute spinal cord injury (in the previous month)  (5 Points)  [ ] Prothrombin 82220 A                                     (3 Points)               [ ] Paralysis  (less than 1 month)                             (5 Points)  [ ] Factor V Leiden                                             (3 Points)                  [ ] Multiple Trauma within 1 month                        (5 Points)  [ ] Lupus anticoagulants                                     (3 Points)                                                           [ ] Anticardiolipin antibodies                               (3 Points)                                                       [ ] High homocysteine in the blood                      (3 Points)                                             [ ] Other congenital or acquired thrombophilia      (3 Points)                                                [ ] Heparin induced thrombocytopenia                  (3 Points)                                     Total Score [     8   ]      Patient is a 70 y/o female aox3,patient c/o right great toe pain , patient has a nonhealing ulcer on her right great toe . Patient reports that she has had this ulcer for about 6 months and has had treatments with podiatry and wound care . She has also had previous RLE angiogram .Patient state that her pain level ranges from 2/10 to 10/10 with pain relief from rest .Patient does use a cane for balance while walking . Patient presents to PST for evaluation for a right femoral posterior tibial artery with saphenous vein with possible popliteal posterior tibial artery bypass with Dr Aguilar on 20     Patient educated on prep for procedure verbal and paper instruction with understanding . review of medication pt to find out from her PCP when to stop medications .

## 2019-12-20 NOTE — H&P PST ADULT - NSICDXPROBLEM_GEN_ALL_CORE_FT
PROBLEM DIAGNOSES  Problem: Peripheral vascular disease  Assessment and Plan: medical clerance with Dr Loco [phone]    Problem: Need for prophylactic measure  Assessment and Plan: Caprini score is 8 high VTE risk SCD's ordered surgical team to assess the need for pharm proph .    Problem: PAD (peripheral artery disease)  Assessment and Plan: medical clearance  Dr Loco

## 2019-12-24 RX ORDER — LOSARTAN POTASSIUM 100 MG/1
1 TABLET, FILM COATED ORAL
Qty: 0 | Refills: 0 | DISCHARGE

## 2020-01-02 ENCOUNTER — TRANSCRIPTION ENCOUNTER (OUTPATIENT)
Age: 70
End: 2020-01-02

## 2020-01-03 ENCOUNTER — INPATIENT (INPATIENT)
Facility: HOSPITAL | Age: 70
LOS: 3 days | Discharge: HOME | DRG: 254 | End: 2020-01-07
Attending: SURGERY | Admitting: SURGERY
Payer: COMMERCIAL

## 2020-01-03 VITALS
SYSTOLIC BLOOD PRESSURE: 116 MMHG | HEART RATE: 88 BPM | RESPIRATION RATE: 20 BRPM | DIASTOLIC BLOOD PRESSURE: 65 MMHG | HEIGHT: 56 IN | OXYGEN SATURATION: 98 % | TEMPERATURE: 98 F | WEIGHT: 162.04 LBS

## 2020-01-03 DIAGNOSIS — Z98.891 HISTORY OF UTERINE SCAR FROM PREVIOUS SURGERY: Chronic | ICD-10-CM

## 2020-01-03 DIAGNOSIS — Z98.89 OTHER SPECIFIED POSTPROCEDURAL STATES: Chronic | ICD-10-CM

## 2020-01-03 DIAGNOSIS — Z96.651 PRESENCE OF RIGHT ARTIFICIAL KNEE JOINT: Chronic | ICD-10-CM

## 2020-01-03 DIAGNOSIS — I73.9 PERIPHERAL VASCULAR DISEASE, UNSPECIFIED: ICD-10-CM

## 2020-01-03 LAB
ABO RH CONFIRMATION: SIGNIFICANT CHANGE UP
GLUCOSE BLDC GLUCOMTR-MCNC: 157 MG/DL — HIGH (ref 70–99)

## 2020-01-03 PROCEDURE — 86923 COMPATIBILITY TEST ELECTRIC: CPT

## 2020-01-03 PROCEDURE — 35571 ART BYP POP-TIBL-PRL-OTHER: CPT | Mod: RT

## 2020-01-03 PROCEDURE — 93005 ELECTROCARDIOGRAM TRACING: CPT

## 2020-01-03 PROCEDURE — G0463: CPT

## 2020-01-03 RX ORDER — OXYCODONE HYDROCHLORIDE 5 MG/1
10 TABLET ORAL EVERY 4 HOURS
Refills: 0 | Status: DISCONTINUED | OUTPATIENT
Start: 2020-01-03 | End: 2020-01-07

## 2020-01-03 RX ORDER — SODIUM CHLORIDE 9 MG/ML
1000 INJECTION, SOLUTION INTRAVENOUS
Refills: 0 | Status: DISCONTINUED | OUTPATIENT
Start: 2020-01-03 | End: 2020-01-07

## 2020-01-03 RX ORDER — LOSARTAN POTASSIUM 100 MG/1
50 TABLET, FILM COATED ORAL DAILY
Refills: 0 | Status: DISCONTINUED | OUTPATIENT
Start: 2020-01-03 | End: 2020-01-07

## 2020-01-03 RX ORDER — ONDANSETRON 8 MG/1
4 TABLET, FILM COATED ORAL EVERY 6 HOURS
Refills: 0 | Status: DISCONTINUED | OUTPATIENT
Start: 2020-01-03 | End: 2020-01-07

## 2020-01-03 RX ORDER — INSULIN LISPRO 100/ML
VIAL (ML) SUBCUTANEOUS AT BEDTIME
Refills: 0 | Status: DISCONTINUED | OUTPATIENT
Start: 2020-01-03 | End: 2020-01-07

## 2020-01-03 RX ORDER — SIMVASTATIN 20 MG/1
40 TABLET, FILM COATED ORAL AT BEDTIME
Refills: 0 | Status: DISCONTINUED | OUTPATIENT
Start: 2020-01-03 | End: 2020-01-07

## 2020-01-03 RX ORDER — DEXTROSE 50 % IN WATER 50 %
25 SYRINGE (ML) INTRAVENOUS ONCE
Refills: 0 | Status: DISCONTINUED | OUTPATIENT
Start: 2020-01-03 | End: 2020-01-07

## 2020-01-03 RX ORDER — TRAMADOL HYDROCHLORIDE 50 MG/1
50 TABLET ORAL EVERY 4 HOURS
Refills: 0 | Status: DISCONTINUED | OUTPATIENT
Start: 2020-01-03 | End: 2020-01-07

## 2020-01-03 RX ORDER — PANTOPRAZOLE SODIUM 20 MG/1
40 TABLET, DELAYED RELEASE ORAL
Refills: 0 | Status: DISCONTINUED | OUTPATIENT
Start: 2020-01-03 | End: 2020-01-07

## 2020-01-03 RX ORDER — CLOPIDOGREL BISULFATE 75 MG/1
75 TABLET, FILM COATED ORAL DAILY
Refills: 0 | Status: DISCONTINUED | OUTPATIENT
Start: 2020-01-03 | End: 2020-01-07

## 2020-01-03 RX ORDER — AMLODIPINE BESYLATE 2.5 MG/1
5 TABLET ORAL DAILY
Refills: 0 | Status: DISCONTINUED | OUTPATIENT
Start: 2020-01-03 | End: 2020-01-07

## 2020-01-03 RX ORDER — HYDROMORPHONE HYDROCHLORIDE 2 MG/ML
0.5 INJECTION INTRAMUSCULAR; INTRAVENOUS; SUBCUTANEOUS
Refills: 0 | Status: DISCONTINUED | OUTPATIENT
Start: 2020-01-03 | End: 2020-01-03

## 2020-01-03 RX ORDER — HEPARIN SODIUM 5000 [USP'U]/ML
5000 INJECTION INTRAVENOUS; SUBCUTANEOUS EVERY 8 HOURS
Refills: 0 | Status: DISCONTINUED | OUTPATIENT
Start: 2020-01-03 | End: 2020-01-07

## 2020-01-03 RX ORDER — ACETAMINOPHEN 500 MG
650 TABLET ORAL EVERY 6 HOURS
Refills: 0 | Status: DISCONTINUED | OUTPATIENT
Start: 2020-01-03 | End: 2020-01-07

## 2020-01-03 RX ORDER — SENNA PLUS 8.6 MG/1
2 TABLET ORAL AT BEDTIME
Refills: 0 | Status: DISCONTINUED | OUTPATIENT
Start: 2020-01-03 | End: 2020-01-07

## 2020-01-03 RX ORDER — GLUCAGON INJECTION, SOLUTION 0.5 MG/.1ML
1 INJECTION, SOLUTION SUBCUTANEOUS ONCE
Refills: 0 | Status: DISCONTINUED | OUTPATIENT
Start: 2020-01-03 | End: 2020-01-07

## 2020-01-03 RX ORDER — DEXTROSE 50 % IN WATER 50 %
12.5 SYRINGE (ML) INTRAVENOUS ONCE
Refills: 0 | Status: DISCONTINUED | OUTPATIENT
Start: 2020-01-03 | End: 2020-01-07

## 2020-01-03 RX ORDER — HYDROMORPHONE HYDROCHLORIDE 2 MG/ML
0.5 INJECTION INTRAMUSCULAR; INTRAVENOUS; SUBCUTANEOUS EVERY 6 HOURS
Refills: 0 | Status: DISCONTINUED | OUTPATIENT
Start: 2020-01-03 | End: 2020-01-07

## 2020-01-03 RX ORDER — SODIUM CHLORIDE 9 MG/ML
1000 INJECTION, SOLUTION INTRAVENOUS
Refills: 0 | Status: DISCONTINUED | OUTPATIENT
Start: 2020-01-03 | End: 2020-01-03

## 2020-01-03 RX ORDER — ASPIRIN/CALCIUM CARB/MAGNESIUM 324 MG
81 TABLET ORAL DAILY
Refills: 0 | Status: DISCONTINUED | OUTPATIENT
Start: 2020-01-03 | End: 2020-01-07

## 2020-01-03 RX ORDER — DEXTROSE 50 % IN WATER 50 %
15 SYRINGE (ML) INTRAVENOUS ONCE
Refills: 0 | Status: DISCONTINUED | OUTPATIENT
Start: 2020-01-03 | End: 2020-01-07

## 2020-01-03 RX ORDER — INSULIN LISPRO 100/ML
VIAL (ML) SUBCUTANEOUS
Refills: 0 | Status: DISCONTINUED | OUTPATIENT
Start: 2020-01-03 | End: 2020-01-07

## 2020-01-03 RX ORDER — ONDANSETRON 8 MG/1
4 TABLET, FILM COATED ORAL ONCE
Refills: 0 | Status: DISCONTINUED | OUTPATIENT
Start: 2020-01-03 | End: 2020-01-03

## 2020-01-03 RX ORDER — TRAMADOL HYDROCHLORIDE 50 MG/1
25 TABLET ORAL EVERY 4 HOURS
Refills: 0 | Status: DISCONTINUED | OUTPATIENT
Start: 2020-01-03 | End: 2020-01-07

## 2020-01-03 RX ADMIN — Medication 1: at 19:09

## 2020-01-03 RX ADMIN — OXYCODONE HYDROCHLORIDE 10 MILLIGRAM(S): 5 TABLET ORAL at 23:13

## 2020-01-03 RX ADMIN — OXYCODONE HYDROCHLORIDE 10 MILLIGRAM(S): 5 TABLET ORAL at 22:57

## 2020-01-03 RX ADMIN — TRAMADOL HYDROCHLORIDE 50 MILLIGRAM(S): 50 TABLET ORAL at 20:10

## 2020-01-03 RX ADMIN — Medication 100 MILLIGRAM(S): at 12:18

## 2020-01-04 LAB
ANION GAP SERPL CALC-SCNC: 11 MMOL/L — SIGNIFICANT CHANGE UP (ref 5–17)
BASOPHILS # BLD AUTO: 0.02 K/UL — SIGNIFICANT CHANGE UP (ref 0–0.2)
BASOPHILS NFR BLD AUTO: 0.2 % — SIGNIFICANT CHANGE UP (ref 0–2)
BUN SERPL-MCNC: 13 MG/DL — SIGNIFICANT CHANGE UP (ref 8–20)
CALCIUM SERPL-MCNC: 8.6 MG/DL — SIGNIFICANT CHANGE UP (ref 8.6–10.2)
CHLORIDE SERPL-SCNC: 104 MMOL/L — SIGNIFICANT CHANGE UP (ref 98–107)
CO2 SERPL-SCNC: 23 MMOL/L — SIGNIFICANT CHANGE UP (ref 22–29)
CREAT SERPL-MCNC: 0.57 MG/DL — SIGNIFICANT CHANGE UP (ref 0.5–1.3)
EOSINOPHIL # BLD AUTO: 0 K/UL — SIGNIFICANT CHANGE UP (ref 0–0.5)
EOSINOPHIL NFR BLD AUTO: 0 % — SIGNIFICANT CHANGE UP (ref 0–6)
GLUCOSE BLDC GLUCOMTR-MCNC: 101 MG/DL — HIGH (ref 70–99)
GLUCOSE BLDC GLUCOMTR-MCNC: 114 MG/DL — HIGH (ref 70–99)
GLUCOSE BLDC GLUCOMTR-MCNC: 116 MG/DL — HIGH (ref 70–99)
GLUCOSE BLDC GLUCOMTR-MCNC: 121 MG/DL — HIGH (ref 70–99)
GLUCOSE BLDC GLUCOMTR-MCNC: 99 MG/DL — SIGNIFICANT CHANGE UP (ref 70–99)
GLUCOSE SERPL-MCNC: 115 MG/DL — SIGNIFICANT CHANGE UP (ref 70–115)
HCT VFR BLD CALC: 34.8 % — SIGNIFICANT CHANGE UP (ref 34.5–45)
HGB BLD-MCNC: 10.9 G/DL — LOW (ref 11.5–15.5)
IMM GRANULOCYTES NFR BLD AUTO: 0.4 % — SIGNIFICANT CHANGE UP (ref 0–1.5)
LYMPHOCYTES # BLD AUTO: 1.56 K/UL — SIGNIFICANT CHANGE UP (ref 1–3.3)
LYMPHOCYTES # BLD AUTO: 18.3 % — SIGNIFICANT CHANGE UP (ref 13–44)
MAGNESIUM SERPL-MCNC: 1.7 MG/DL — SIGNIFICANT CHANGE UP (ref 1.6–2.6)
MCHC RBC-ENTMCNC: 28.9 PG — SIGNIFICANT CHANGE UP (ref 27–34)
MCHC RBC-ENTMCNC: 31.3 GM/DL — LOW (ref 32–36)
MCV RBC AUTO: 92.3 FL — SIGNIFICANT CHANGE UP (ref 80–100)
MONOCYTES # BLD AUTO: 0.89 K/UL — SIGNIFICANT CHANGE UP (ref 0–0.9)
MONOCYTES NFR BLD AUTO: 10.5 % — SIGNIFICANT CHANGE UP (ref 2–14)
NEUTROPHILS # BLD AUTO: 6.01 K/UL — SIGNIFICANT CHANGE UP (ref 1.8–7.4)
NEUTROPHILS NFR BLD AUTO: 70.6 % — SIGNIFICANT CHANGE UP (ref 43–77)
PLATELET # BLD AUTO: 266 K/UL — SIGNIFICANT CHANGE UP (ref 150–400)
POTASSIUM SERPL-MCNC: 4.6 MMOL/L — SIGNIFICANT CHANGE UP (ref 3.5–5.3)
POTASSIUM SERPL-SCNC: 4.6 MMOL/L — SIGNIFICANT CHANGE UP (ref 3.5–5.3)
RBC # BLD: 3.77 M/UL — LOW (ref 3.8–5.2)
RBC # FLD: 15.6 % — HIGH (ref 10.3–14.5)
SODIUM SERPL-SCNC: 138 MMOL/L — SIGNIFICANT CHANGE UP (ref 135–145)
WBC # BLD: 8.51 K/UL — SIGNIFICANT CHANGE UP (ref 3.8–10.5)
WBC # FLD AUTO: 8.51 K/UL — SIGNIFICANT CHANGE UP (ref 3.8–10.5)

## 2020-01-04 RX ADMIN — HYDROMORPHONE HYDROCHLORIDE 0.5 MILLIGRAM(S): 2 INJECTION INTRAMUSCULAR; INTRAVENOUS; SUBCUTANEOUS at 01:00

## 2020-01-04 RX ADMIN — OXYCODONE HYDROCHLORIDE 10 MILLIGRAM(S): 5 TABLET ORAL at 06:59

## 2020-01-04 RX ADMIN — Medication 650 MILLIGRAM(S): at 05:55

## 2020-01-04 RX ADMIN — OXYCODONE HYDROCHLORIDE 10 MILLIGRAM(S): 5 TABLET ORAL at 20:11

## 2020-01-04 RX ADMIN — HYDROMORPHONE HYDROCHLORIDE 0.5 MILLIGRAM(S): 2 INJECTION INTRAMUSCULAR; INTRAVENOUS; SUBCUTANEOUS at 01:16

## 2020-01-04 RX ADMIN — Medication 650 MILLIGRAM(S): at 17:32

## 2020-01-04 RX ADMIN — Medication 650 MILLIGRAM(S): at 11:42

## 2020-01-04 RX ADMIN — Medication 650 MILLIGRAM(S): at 05:40

## 2020-01-04 RX ADMIN — CLOPIDOGREL BISULFATE 75 MILLIGRAM(S): 75 TABLET, FILM COATED ORAL at 11:42

## 2020-01-04 RX ADMIN — Medication 650 MILLIGRAM(S): at 01:03

## 2020-01-04 RX ADMIN — Medication 650 MILLIGRAM(S): at 17:30

## 2020-01-04 RX ADMIN — Medication 650 MILLIGRAM(S): at 00:48

## 2020-01-04 RX ADMIN — HEPARIN SODIUM 5000 UNIT(S): 5000 INJECTION INTRAVENOUS; SUBCUTANEOUS at 21:07

## 2020-01-04 RX ADMIN — Medication 650 MILLIGRAM(S): at 23:06

## 2020-01-04 RX ADMIN — HEPARIN SODIUM 5000 UNIT(S): 5000 INJECTION INTRAVENOUS; SUBCUTANEOUS at 00:48

## 2020-01-04 RX ADMIN — HEPARIN SODIUM 5000 UNIT(S): 5000 INJECTION INTRAVENOUS; SUBCUTANEOUS at 13:48

## 2020-01-04 RX ADMIN — Medication 81 MILLIGRAM(S): at 11:42

## 2020-01-04 RX ADMIN — HYDROMORPHONE HYDROCHLORIDE 0.5 MILLIGRAM(S): 2 INJECTION INTRAMUSCULAR; INTRAVENOUS; SUBCUTANEOUS at 23:06

## 2020-01-04 RX ADMIN — SENNA PLUS 2 TABLET(S): 8.6 TABLET ORAL at 21:07

## 2020-01-04 RX ADMIN — Medication 650 MILLIGRAM(S): at 23:20

## 2020-01-04 RX ADMIN — SIMVASTATIN 40 MILLIGRAM(S): 20 TABLET, FILM COATED ORAL at 00:47

## 2020-01-04 RX ADMIN — Medication 1 TABLET(S): at 11:41

## 2020-01-04 RX ADMIN — SENNA PLUS 2 TABLET(S): 8.6 TABLET ORAL at 00:48

## 2020-01-04 RX ADMIN — SIMVASTATIN 40 MILLIGRAM(S): 20 TABLET, FILM COATED ORAL at 21:07

## 2020-01-04 RX ADMIN — Medication 650 MILLIGRAM(S): at 12:32

## 2020-01-04 RX ADMIN — OXYCODONE HYDROCHLORIDE 10 MILLIGRAM(S): 5 TABLET ORAL at 06:48

## 2020-01-04 RX ADMIN — PANTOPRAZOLE SODIUM 40 MILLIGRAM(S): 20 TABLET, DELAYED RELEASE ORAL at 05:41

## 2020-01-04 RX ADMIN — OXYCODONE HYDROCHLORIDE 10 MILLIGRAM(S): 5 TABLET ORAL at 21:10

## 2020-01-04 RX ADMIN — HEPARIN SODIUM 5000 UNIT(S): 5000 INJECTION INTRAVENOUS; SUBCUTANEOUS at 05:40

## 2020-01-04 RX ADMIN — HYDROMORPHONE HYDROCHLORIDE 0.5 MILLIGRAM(S): 2 INJECTION INTRAMUSCULAR; INTRAVENOUS; SUBCUTANEOUS at 23:20

## 2020-01-04 NOTE — PROGRESS NOTE ADULT - ASSESSMENT
68 y/o female s/p open above knee popliteal to posterior tibia bypass with ipsilateral saphenous vein harvest. Patient tolerated procedure well and returned to surgical floors in stable condition. Graft with palpable pulse and triphasic PT signal distal to graft.  - Continue q4 hour neurovascular checks  - Continue SQH, ASA, plavix  - Bed rest overnight  - Possible d/c nichols in AM  - Pain control w/ current PO regimen  - Regular diet, as tolerated w/ ensure supplements  - Physical therapy ordered to assist w/ ambulation  - Incentive spirometer for pulm toileian

## 2020-01-04 NOTE — PROGRESS NOTE ADULT - SUBJECTIVE AND OBJECTIVE BOX
POST-OP CHECK: Patient seen and examined at bedside - she is s/p open above knee popliteal to posterior tibial bypass with native vein. Patient currently complaining of pain to RLE, mostly to localized to distal aspect of R shin & right toe. She states that medications helping to give relief. Denies numbness / tingling to affected extremity. Tolerated liquids, has not yet eaten. Remains on bedrest. Nichols catheter in place. Denies chest pain, SOB, fever, chills, abdominal pain, nausea, or vomiting.     MEDICATIONS  (STANDING):  acetaminophen   Tablet .. 650 milliGRAM(s) Oral every 6 hours  amLODIPine   Tablet 5 milliGRAM(s) Oral daily  aspirin  chewable 81 milliGRAM(s) Oral daily  clopidogrel Tablet 75 milliGRAM(s) Oral daily  dextrose 5%. 1000 milliLiter(s) (50 mL/Hr) IV Continuous <Continuous>  dextrose 50% Injectable 12.5 Gram(s) IV Push once  dextrose 50% Injectable 25 Gram(s) IV Push once  dextrose 50% Injectable 25 Gram(s) IV Push once  heparin  Injectable 5000 Unit(s) SubCutaneous every 8 hours  insulin lispro (HumaLOG) corrective regimen sliding scale   SubCutaneous three times a day before meals  insulin lispro (HumaLOG) corrective regimen sliding scale   SubCutaneous at bedtime  losartan 50 milliGRAM(s) Oral daily  multivitamin 1 Tablet(s) Oral daily  pantoprazole    Tablet 40 milliGRAM(s) Oral before breakfast  senna 2 Tablet(s) Oral at bedtime  simvastatin 40 milliGRAM(s) Oral at bedtime    MEDICATIONS  (PRN):  dextrose 40% Gel 15 Gram(s) Oral once PRN Blood Glucose LESS THAN 70 milliGRAM(s)/deciliter  glucagon  Injectable 1 milliGRAM(s) IntraMuscular once PRN Glucose LESS THAN 70 milligrams/deciliter  HYDROmorphone  Injectable 0.5 milliGRAM(s) IV Push every 6 hours PRN Breakthrough Pain  ondansetron Injectable 4 milliGRAM(s) IV Push every 6 hours PRN Nausea  oxyCODONE    IR 10 milliGRAM(s) Oral every 4 hours PRN Severe Pain (7 - 10)  traMADol 25 milliGRAM(s) Oral every 4 hours PRN Mild Pain (1 - 3)  traMADol 50 milliGRAM(s) Oral every 4 hours PRN Moderate Pain (4 - 6)    Vital Signs Last 24 Hrs  T(C): 36.4 (03 Jan 2020 21:27), Max: 36.7 (03 Jan 2020 17:55)  T(F): 97.6 (03 Jan 2020 21:27), Max: 98 (03 Jan 2020 17:55)  HR: 78 (03 Jan 2020 21:27) (78 - 102)  BP: 103/71 (03 Jan 2020 21:27) (96/56 - 116/65)  BP(mean): --  RR: 18 (03 Jan 2020 21:27) (16 - 22)  SpO2: 94% (03 Jan 2020 21:27) (94% - 100%)    Constitutional: patient appears mildly uncomfortable lying in bed, non-toxic appearing  Respiratory: respirations are unlabored, no accessory muscle use, no conversational dyspnea  Cardiovascular: regular rate & rhythm  Gastrointestinal: abdomen soft, NT, ND, no rebound tenderness / guarding  Vascular: overlying ACE bandage to RLE is C/D/I. R groin soft, no palpable hematoma. Palpable graft pulse & triphasic PT signal just distal to bypass. Right foot is warm, pink, able to move toes freely  : nichols catheter in place to gravity, clear yellow urine in bag  Neurological: A&O x 3    I&O's Detail    03 Jan 2020 07:01  -  04 Jan 2020 00:12  --------------------------------------------------------  IN:  Total IN: 0 mL    OUT:    Indwelling Catheter - Urethral: 650 mL  Total OUT: 650 mL    Total NET: -650 mL

## 2020-01-05 LAB
ANION GAP SERPL CALC-SCNC: 9 MMOL/L — SIGNIFICANT CHANGE UP (ref 5–17)
BASOPHILS # BLD AUTO: 0.07 K/UL — SIGNIFICANT CHANGE UP (ref 0–0.2)
BASOPHILS NFR BLD AUTO: 0.8 % — SIGNIFICANT CHANGE UP (ref 0–2)
BUN SERPL-MCNC: 15 MG/DL — SIGNIFICANT CHANGE UP (ref 8–20)
CALCIUM SERPL-MCNC: 8.6 MG/DL — SIGNIFICANT CHANGE UP (ref 8.6–10.2)
CHLORIDE SERPL-SCNC: 103 MMOL/L — SIGNIFICANT CHANGE UP (ref 98–107)
CO2 SERPL-SCNC: 25 MMOL/L — SIGNIFICANT CHANGE UP (ref 22–29)
CREAT SERPL-MCNC: 0.61 MG/DL — SIGNIFICANT CHANGE UP (ref 0.5–1.3)
EOSINOPHIL # BLD AUTO: 0.21 K/UL — SIGNIFICANT CHANGE UP (ref 0–0.5)
EOSINOPHIL NFR BLD AUTO: 2.4 % — SIGNIFICANT CHANGE UP (ref 0–6)
GLUCOSE BLDC GLUCOMTR-MCNC: 133 MG/DL — HIGH (ref 70–99)
GLUCOSE BLDC GLUCOMTR-MCNC: 200 MG/DL — HIGH (ref 70–99)
GLUCOSE BLDC GLUCOMTR-MCNC: 80 MG/DL — SIGNIFICANT CHANGE UP (ref 70–99)
GLUCOSE BLDC GLUCOMTR-MCNC: 96 MG/DL — SIGNIFICANT CHANGE UP (ref 70–99)
GLUCOSE SERPL-MCNC: 101 MG/DL — SIGNIFICANT CHANGE UP (ref 70–115)
HCT VFR BLD CALC: 33.7 % — LOW (ref 34.5–45)
HGB BLD-MCNC: 10.8 G/DL — LOW (ref 11.5–15.5)
IMM GRANULOCYTES NFR BLD AUTO: 0.3 % — SIGNIFICANT CHANGE UP (ref 0–1.5)
LYMPHOCYTES # BLD AUTO: 3.55 K/UL — HIGH (ref 1–3.3)
LYMPHOCYTES # BLD AUTO: 40.8 % — SIGNIFICANT CHANGE UP (ref 13–44)
MAGNESIUM SERPL-MCNC: 1.8 MG/DL — SIGNIFICANT CHANGE UP (ref 1.6–2.6)
MCHC RBC-ENTMCNC: 29.3 PG — SIGNIFICANT CHANGE UP (ref 27–34)
MCHC RBC-ENTMCNC: 32 GM/DL — SIGNIFICANT CHANGE UP (ref 32–36)
MCV RBC AUTO: 91.3 FL — SIGNIFICANT CHANGE UP (ref 80–100)
MONOCYTES # BLD AUTO: 0.87 K/UL — SIGNIFICANT CHANGE UP (ref 0–0.9)
MONOCYTES NFR BLD AUTO: 10 % — SIGNIFICANT CHANGE UP (ref 2–14)
NEUTROPHILS # BLD AUTO: 3.98 K/UL — SIGNIFICANT CHANGE UP (ref 1.8–7.4)
NEUTROPHILS NFR BLD AUTO: 45.7 % — SIGNIFICANT CHANGE UP (ref 43–77)
PHOSPHATE SERPL-MCNC: 2.6 MG/DL — SIGNIFICANT CHANGE UP (ref 2.4–4.7)
PLATELET # BLD AUTO: 277 K/UL — SIGNIFICANT CHANGE UP (ref 150–400)
POTASSIUM SERPL-MCNC: 3.7 MMOL/L — SIGNIFICANT CHANGE UP (ref 3.5–5.3)
POTASSIUM SERPL-SCNC: 3.7 MMOL/L — SIGNIFICANT CHANGE UP (ref 3.5–5.3)
RBC # BLD: 3.69 M/UL — LOW (ref 3.8–5.2)
RBC # FLD: 15.6 % — HIGH (ref 10.3–14.5)
SODIUM SERPL-SCNC: 137 MMOL/L — SIGNIFICANT CHANGE UP (ref 135–145)
WBC # BLD: 8.71 K/UL — SIGNIFICANT CHANGE UP (ref 3.8–10.5)
WBC # FLD AUTO: 8.71 K/UL — SIGNIFICANT CHANGE UP (ref 3.8–10.5)

## 2020-01-05 RX ORDER — SODIUM,POTASSIUM PHOSPHATES 278-250MG
1 POWDER IN PACKET (EA) ORAL ONCE
Refills: 0 | Status: COMPLETED | OUTPATIENT
Start: 2020-01-05 | End: 2020-01-05

## 2020-01-05 RX ORDER — POTASSIUM CHLORIDE 20 MEQ
20 PACKET (EA) ORAL ONCE
Refills: 0 | Status: COMPLETED | OUTPATIENT
Start: 2020-01-05 | End: 2020-01-05

## 2020-01-05 RX ORDER — MAGNESIUM SULFATE 500 MG/ML
2 VIAL (ML) INJECTION ONCE
Refills: 0 | Status: COMPLETED | OUTPATIENT
Start: 2020-01-05 | End: 2020-01-05

## 2020-01-05 RX ADMIN — CLOPIDOGREL BISULFATE 75 MILLIGRAM(S): 75 TABLET, FILM COATED ORAL at 11:12

## 2020-01-05 RX ADMIN — Medication 650 MILLIGRAM(S): at 18:11

## 2020-01-05 RX ADMIN — Medication 650 MILLIGRAM(S): at 06:17

## 2020-01-05 RX ADMIN — Medication 650 MILLIGRAM(S): at 17:47

## 2020-01-05 RX ADMIN — Medication 650 MILLIGRAM(S): at 05:36

## 2020-01-05 RX ADMIN — OXYCODONE HYDROCHLORIDE 10 MILLIGRAM(S): 5 TABLET ORAL at 03:45

## 2020-01-05 RX ADMIN — Medication 1 TABLET(S): at 11:11

## 2020-01-05 RX ADMIN — Medication 650 MILLIGRAM(S): at 11:12

## 2020-01-05 RX ADMIN — Medication 650 MILLIGRAM(S): at 11:11

## 2020-01-05 RX ADMIN — Medication 650 MILLIGRAM(S): at 23:33

## 2020-01-05 RX ADMIN — OXYCODONE HYDROCHLORIDE 10 MILLIGRAM(S): 5 TABLET ORAL at 15:00

## 2020-01-05 RX ADMIN — HEPARIN SODIUM 5000 UNIT(S): 5000 INJECTION INTRAVENOUS; SUBCUTANEOUS at 21:35

## 2020-01-05 RX ADMIN — AMLODIPINE BESYLATE 5 MILLIGRAM(S): 2.5 TABLET ORAL at 05:36

## 2020-01-05 RX ADMIN — PANTOPRAZOLE SODIUM 40 MILLIGRAM(S): 20 TABLET, DELAYED RELEASE ORAL at 05:37

## 2020-01-05 RX ADMIN — OXYCODONE HYDROCHLORIDE 10 MILLIGRAM(S): 5 TABLET ORAL at 04:30

## 2020-01-05 RX ADMIN — Medication 1 PACKET(S): at 11:11

## 2020-01-05 RX ADMIN — HEPARIN SODIUM 5000 UNIT(S): 5000 INJECTION INTRAVENOUS; SUBCUTANEOUS at 05:36

## 2020-01-05 RX ADMIN — Medication 81 MILLIGRAM(S): at 11:11

## 2020-01-05 RX ADMIN — Medication 50 GRAM(S): at 11:11

## 2020-01-05 RX ADMIN — Medication 20 MILLIEQUIVALENT(S): at 11:11

## 2020-01-05 RX ADMIN — SIMVASTATIN 40 MILLIGRAM(S): 20 TABLET, FILM COATED ORAL at 21:35

## 2020-01-05 RX ADMIN — HEPARIN SODIUM 5000 UNIT(S): 5000 INJECTION INTRAVENOUS; SUBCUTANEOUS at 13:36

## 2020-01-05 RX ADMIN — SENNA PLUS 2 TABLET(S): 8.6 TABLET ORAL at 21:35

## 2020-01-05 RX ADMIN — OXYCODONE HYDROCHLORIDE 10 MILLIGRAM(S): 5 TABLET ORAL at 14:39

## 2020-01-05 RX ADMIN — Medication 1: at 16:36

## 2020-01-05 RX ADMIN — LOSARTAN POTASSIUM 50 MILLIGRAM(S): 100 TABLET, FILM COATED ORAL at 05:37

## 2020-01-05 NOTE — PHYSICAL THERAPY INITIAL EVALUATION ADULT - ACTIVE RANGE OF MOTION EXAMINATION, REHAB EVAL
no Active ROM deficits were identified/with exception to right knee and nkle limited by pain and ACE wrap

## 2020-01-05 NOTE — PHYSICAL THERAPY INITIAL EVALUATION ADULT - MANUAL MUSCLE TESTING RESULTS, REHAB EVAL
4+/5 throughout with exception to right knee ext and ankle DF/PF at  least 3/5 limited by pain/no strength deficits were identified

## 2020-01-05 NOTE — PROGRESS NOTE ADULT - SUBJECTIVE AND OBJECTIVE BOX
HPI/OVERNIGHT EVENTS: Patient seen and examined at bedside - she is s/p open above knee popliteal to posterior tibial bypass with native vein. Patient currently complaining of pain to RLE, mostly to localized to distal aspect of R shin & right toe. She states that medications helping to give relief. Denies numbness / tingling to affected extremity. Tolerating diet. Remains on bedrest. Nichols catheter in place. Denies chest pain, SOB, fever, chills, abdominal pain, nausea, or vomiting. Patient reporting hoarseness from ET tube.    MEDICATIONS  (STANDING):  acetaminophen   Tablet .. 650 milliGRAM(s) Oral every 6 hours  amLODIPine   Tablet 5 milliGRAM(s) Oral daily  aspirin  chewable 81 milliGRAM(s) Oral daily  clopidogrel Tablet 75 milliGRAM(s) Oral daily  dextrose 5%. 1000 milliLiter(s) (50 mL/Hr) IV Continuous <Continuous>  dextrose 50% Injectable 12.5 Gram(s) IV Push once  dextrose 50% Injectable 25 Gram(s) IV Push once  dextrose 50% Injectable 25 Gram(s) IV Push once  heparin  Injectable 5000 Unit(s) SubCutaneous every 8 hours  insulin lispro (HumaLOG) corrective regimen sliding scale   SubCutaneous three times a day before meals  insulin lispro (HumaLOG) corrective regimen sliding scale   SubCutaneous at bedtime  losartan 50 milliGRAM(s) Oral daily  multivitamin 1 Tablet(s) Oral daily  pantoprazole    Tablet 40 milliGRAM(s) Oral before breakfast  senna 2 Tablet(s) Oral at bedtime  simvastatin 40 milliGRAM(s) Oral at bedtime    MEDICATIONS  (PRN):  dextrose 40% Gel 15 Gram(s) Oral once PRN Blood Glucose LESS THAN 70 milliGRAM(s)/deciliter  glucagon  Injectable 1 milliGRAM(s) IntraMuscular once PRN Glucose LESS THAN 70 milligrams/deciliter  HYDROmorphone  Injectable 0.5 milliGRAM(s) IV Push every 6 hours PRN Breakthrough Pain  ondansetron Injectable 4 milliGRAM(s) IV Push every 6 hours PRN Nausea  oxyCODONE    IR 10 milliGRAM(s) Oral every 4 hours PRN Severe Pain (7 - 10)  traMADol 25 milliGRAM(s) Oral every 4 hours PRN Mild Pain (1 - 3)  traMADol 50 milliGRAM(s) Oral every 4 hours PRN Moderate Pain (4 - 6)      Vital Signs Last 24 Hrs  T(C): 36.7 (05 Jan 2020 05:31), Max: 36.8 (04 Jan 2020 21:14)  T(F): 98.1 (05 Jan 2020 05:31), Max: 98.3 (04 Jan 2020 21:14)  HR: 87 (05 Jan 2020 05:31) (59 - 92)  BP: 109/69 (05 Jan 2020 05:31) (94/63 - 114/64)  BP(mean): --  RR: 16 (05 Jan 2020 05:31) (16 - 18)  SpO2: 93% (05 Jan 2020 05:31) (91% - 99%)    Constitutional: patient appears mildly uncomfortable lying in bed, non-toxic appearing  Respiratory: respirations are unlabored, no accessory muscle use, no conversational dyspnea  Cardiovascular: regular rate & rhythm  Gastrointestinal: abdomen soft, NT, ND, no rebound tenderness / guarding  Vascular: overlying ACE bandage to RLE is C/D/I. R groin soft, no palpable hematoma. Palpable graft pulse & triphasic PT signal just distal to bypass. Right foot is warm, pink, able to move toes freely  : nichols catheter in place to gravity, clear yellow urine in bag  Neurological: A&O x 3      I&O's Detail    04 Jan 2020 07:01  -  05 Jan 2020 07:00  --------------------------------------------------------  IN:    Oral Fluid: 480 mL  Total IN: 480 mL    OUT:    Indwelling Catheter - Urethral: 1750 mL  Total OUT: 1750 mL    Total NET: -1270 mL          LABS:                        10.9   8.51  )-----------( 266      ( 04 Jan 2020 07:27 )             34.8     01-04    138  |  104  |  13.0  ----------------------------<  115  4.6   |  23.0  |  0.57    Ca    8.6      04 Jan 2020 07:27  Mg     1.7     01-04

## 2020-01-05 NOTE — PROGRESS NOTE ADULT - ASSESSMENT
68 y/o female s/p open above knee popliteal to posterior tibia bypass with ipsilateral saphenous vein harvest. Patient tolerated procedure well and returned to surgical floors in stable condition. Graft with palpable pulse and triphasic PT signal distal to graft.    - Continue q4 hour neurovascular checks  - Continue SQH, ASA, plavix  - Bed rest overnight  - d/c simone julio  - Pain control w/ current PO regimen  - Regular diet, as tolerated w/ ensure supplements  - Physical therapy ordered to assist w/ ambulation  - Incentive spirometer for pulm toilette

## 2020-01-05 NOTE — PHYSICAL THERAPY INITIAL EVALUATION ADULT - ADDITIONAL COMMENTS
Pt. lives in a house with her , children, and father in law. Pt. reports everyone works except for father in law who is at home with her during the day, but he is unable to physically assist. As per discussion with pt. daughter (Dr. Patricia Carvajal) pt. sister can stay with her upon d/c to assist as needed 24/7. pt. has 2 STEF without rail and no stairs inside. Pt. was modified independent PTA and does not own any other DME.

## 2020-01-06 ENCOUNTER — TRANSCRIPTION ENCOUNTER (OUTPATIENT)
Age: 70
End: 2020-01-06

## 2020-01-06 LAB
ANION GAP SERPL CALC-SCNC: 11 MMOL/L — SIGNIFICANT CHANGE UP (ref 5–17)
BASOPHILS # BLD AUTO: 0.06 K/UL — SIGNIFICANT CHANGE UP (ref 0–0.2)
BASOPHILS NFR BLD AUTO: 0.6 % — SIGNIFICANT CHANGE UP (ref 0–2)
BUN SERPL-MCNC: 14 MG/DL — SIGNIFICANT CHANGE UP (ref 8–20)
CALCIUM SERPL-MCNC: 8.7 MG/DL — SIGNIFICANT CHANGE UP (ref 8.6–10.2)
CHLORIDE SERPL-SCNC: 105 MMOL/L — SIGNIFICANT CHANGE UP (ref 98–107)
CO2 SERPL-SCNC: 23 MMOL/L — SIGNIFICANT CHANGE UP (ref 22–29)
CREAT SERPL-MCNC: 0.51 MG/DL — SIGNIFICANT CHANGE UP (ref 0.5–1.3)
EOSINOPHIL # BLD AUTO: 0.32 K/UL — SIGNIFICANT CHANGE UP (ref 0–0.5)
EOSINOPHIL NFR BLD AUTO: 3.4 % — SIGNIFICANT CHANGE UP (ref 0–6)
GLUCOSE BLDC GLUCOMTR-MCNC: 109 MG/DL — HIGH (ref 70–99)
GLUCOSE BLDC GLUCOMTR-MCNC: 128 MG/DL — HIGH (ref 70–99)
GLUCOSE BLDC GLUCOMTR-MCNC: 93 MG/DL — SIGNIFICANT CHANGE UP (ref 70–99)
GLUCOSE BLDC GLUCOMTR-MCNC: 99 MG/DL — SIGNIFICANT CHANGE UP (ref 70–99)
GLUCOSE SERPL-MCNC: 95 MG/DL — SIGNIFICANT CHANGE UP (ref 70–115)
HBA1C BLD-MCNC: 5.6 % — SIGNIFICANT CHANGE UP (ref 4–5.6)
HCT VFR BLD CALC: 34.6 % — SIGNIFICANT CHANGE UP (ref 34.5–45)
HGB BLD-MCNC: 11 G/DL — LOW (ref 11.5–15.5)
IMM GRANULOCYTES NFR BLD AUTO: 0.3 % — SIGNIFICANT CHANGE UP (ref 0–1.5)
LYMPHOCYTES # BLD AUTO: 3.46 K/UL — HIGH (ref 1–3.3)
LYMPHOCYTES # BLD AUTO: 36.8 % — SIGNIFICANT CHANGE UP (ref 13–44)
MAGNESIUM SERPL-MCNC: 1.8 MG/DL — SIGNIFICANT CHANGE UP (ref 1.6–2.6)
MCHC RBC-ENTMCNC: 28.9 PG — SIGNIFICANT CHANGE UP (ref 27–34)
MCHC RBC-ENTMCNC: 31.8 GM/DL — LOW (ref 32–36)
MCV RBC AUTO: 91.1 FL — SIGNIFICANT CHANGE UP (ref 80–100)
MONOCYTES # BLD AUTO: 0.69 K/UL — SIGNIFICANT CHANGE UP (ref 0–0.9)
MONOCYTES NFR BLD AUTO: 7.3 % — SIGNIFICANT CHANGE UP (ref 2–14)
NEUTROPHILS # BLD AUTO: 4.84 K/UL — SIGNIFICANT CHANGE UP (ref 1.8–7.4)
NEUTROPHILS NFR BLD AUTO: 51.6 % — SIGNIFICANT CHANGE UP (ref 43–77)
PHOSPHATE SERPL-MCNC: 3.1 MG/DL — SIGNIFICANT CHANGE UP (ref 2.4–4.7)
PLATELET # BLD AUTO: 298 K/UL — SIGNIFICANT CHANGE UP (ref 150–400)
POTASSIUM SERPL-MCNC: 4 MMOL/L — SIGNIFICANT CHANGE UP (ref 3.5–5.3)
POTASSIUM SERPL-SCNC: 4 MMOL/L — SIGNIFICANT CHANGE UP (ref 3.5–5.3)
RBC # BLD: 3.8 M/UL — SIGNIFICANT CHANGE UP (ref 3.8–5.2)
RBC # FLD: 15.5 % — HIGH (ref 10.3–14.5)
SODIUM SERPL-SCNC: 139 MMOL/L — SIGNIFICANT CHANGE UP (ref 135–145)
WBC # BLD: 9.4 K/UL — SIGNIFICANT CHANGE UP (ref 3.8–10.5)
WBC # FLD AUTO: 9.4 K/UL — SIGNIFICANT CHANGE UP (ref 3.8–10.5)

## 2020-01-06 RX ORDER — TRAMADOL HYDROCHLORIDE 50 MG/1
1 TABLET ORAL
Qty: 20 | Refills: 0
Start: 2020-01-06

## 2020-01-06 RX ORDER — ACETAMINOPHEN 500 MG
2 TABLET ORAL
Qty: 0 | Refills: 0 | DISCHARGE
Start: 2020-01-06

## 2020-01-06 RX ORDER — MAGNESIUM SULFATE 500 MG/ML
2 VIAL (ML) INJECTION ONCE
Refills: 0 | Status: COMPLETED | OUTPATIENT
Start: 2020-01-06 | End: 2020-01-06

## 2020-01-06 RX ORDER — LOSARTAN POTASSIUM 100 MG/1
1 TABLET, FILM COATED ORAL
Qty: 0 | Refills: 0 | DISCHARGE

## 2020-01-06 RX ORDER — IBUPROFEN 200 MG
1 TABLET ORAL
Qty: 0 | Refills: 0 | DISCHARGE

## 2020-01-06 RX ADMIN — Medication 50 GRAM(S): at 13:07

## 2020-01-06 RX ADMIN — HEPARIN SODIUM 5000 UNIT(S): 5000 INJECTION INTRAVENOUS; SUBCUTANEOUS at 23:02

## 2020-01-06 RX ADMIN — CLOPIDOGREL BISULFATE 75 MILLIGRAM(S): 75 TABLET, FILM COATED ORAL at 13:04

## 2020-01-06 RX ADMIN — Medication 650 MILLIGRAM(S): at 00:00

## 2020-01-06 RX ADMIN — Medication 650 MILLIGRAM(S): at 07:00

## 2020-01-06 RX ADMIN — SIMVASTATIN 40 MILLIGRAM(S): 20 TABLET, FILM COATED ORAL at 23:03

## 2020-01-06 RX ADMIN — OXYCODONE HYDROCHLORIDE 10 MILLIGRAM(S): 5 TABLET ORAL at 20:32

## 2020-01-06 RX ADMIN — HEPARIN SODIUM 5000 UNIT(S): 5000 INJECTION INTRAVENOUS; SUBCUTANEOUS at 13:04

## 2020-01-06 RX ADMIN — HEPARIN SODIUM 5000 UNIT(S): 5000 INJECTION INTRAVENOUS; SUBCUTANEOUS at 06:32

## 2020-01-06 RX ADMIN — OXYCODONE HYDROCHLORIDE 10 MILLIGRAM(S): 5 TABLET ORAL at 09:08

## 2020-01-06 RX ADMIN — Medication 650 MILLIGRAM(S): at 14:04

## 2020-01-06 RX ADMIN — PANTOPRAZOLE SODIUM 40 MILLIGRAM(S): 20 TABLET, DELAYED RELEASE ORAL at 06:32

## 2020-01-06 RX ADMIN — Medication 1 TABLET(S): at 13:04

## 2020-01-06 RX ADMIN — Medication 650 MILLIGRAM(S): at 06:32

## 2020-01-06 RX ADMIN — OXYCODONE HYDROCHLORIDE 10 MILLIGRAM(S): 5 TABLET ORAL at 08:02

## 2020-01-06 RX ADMIN — Medication 650 MILLIGRAM(S): at 13:04

## 2020-01-06 RX ADMIN — SODIUM CHLORIDE 3 MILLILITER(S): 9 INJECTION INTRAMUSCULAR; INTRAVENOUS; SUBCUTANEOUS at 13:07

## 2020-01-06 RX ADMIN — SENNA PLUS 2 TABLET(S): 8.6 TABLET ORAL at 23:03

## 2020-01-06 RX ADMIN — Medication 650 MILLIGRAM(S): at 23:02

## 2020-01-06 RX ADMIN — OXYCODONE HYDROCHLORIDE 10 MILLIGRAM(S): 5 TABLET ORAL at 21:30

## 2020-01-06 RX ADMIN — Medication 81 MILLIGRAM(S): at 13:04

## 2020-01-06 RX ADMIN — SODIUM CHLORIDE 3 MILLILITER(S): 9 INJECTION INTRAMUSCULAR; INTRAVENOUS; SUBCUTANEOUS at 23:31

## 2020-01-06 NOTE — DISCHARGE NOTE PROVIDER - CARE PROVIDER_API CALL
ManvarSingh, Pallavi B (MD)  Vascular Surgery  [address], [address] [place] [address]  Phone: [phone]  Fax: [phone]  Follow Up Time: 1 week

## 2020-01-06 NOTE — OCCUPATIONAL THERAPY INITIAL EVALUATION ADULT - DIAGNOSIS, OT EVAL
s/p open above knee popliteal to posterior tibia bypass with ipsilateral saphenous (native) vein harvest

## 2020-01-06 NOTE — DISCHARGE NOTE PROVIDER - NSDCCPCAREPLAN_GEN_ALL_CORE_FT
PRINCIPAL DISCHARGE DIAGNOSIS  Diagnosis: PAD (peripheral artery disease)  Assessment and Plan of Treatment:       SECONDARY DISCHARGE DIAGNOSES  Diagnosis: HLD (hyperlipidemia)  Assessment and Plan of Treatment:     Diagnosis: HTN (hypertension)  Assessment and Plan of Treatment:     Diagnosis: DM (diabetes mellitus)  Assessment and Plan of Treatment:     Diagnosis: Claudication  Assessment and Plan of Treatment:

## 2020-01-06 NOTE — DISCHARGE NOTE NURSING/CASE MANAGEMENT/SOCIAL WORK - PATIENT PORTAL LINK FT
You can access the FollowMyHealth Patient Portal offered by [facility] by registering at the following website: [email or web address]. By joining [facility]’s FollowMyHealth portal, you will also be able to view your health information using other applications (apps) compatible with our system.

## 2020-01-06 NOTE — DISCHARGE NOTE PROVIDER - CARE PROVIDERS DIRECT ADDRESSES
[email or web address]

## 2020-01-06 NOTE — PROGRESS NOTE ADULT - ASSESSMENT
70 y/o female s/p open above knee popliteal to posterior tibia bypass with ipsilateral saphenous vein harvest. Patient continues to do well post operatively with well controlled pain. Working with PT and ambulating with assistance. Graft with palpable pulse and triphasic PT signal distal to graft.    - Continue q4 hour neurovascular checks  - Continue SQH, ASA, plavix  - Pain control w/ current PO regimen  - Regular diet, as tolerated w/ ensure supplements  - Physical therapy to continue working with patient   - Encourage ambulation with assist and incentive spirometer

## 2020-01-06 NOTE — PROGRESS NOTE ADULT - SUBJECTIVE AND OBJECTIVE BOX
HPI/OVERNIGHT EVENTS:  No acute overnight events. Vital signs stable. Patient feeling well with well controlled pain. Denies numbness or tingling of RLE. Vincent discontinued yesterday, urinating without issue. Ambulating with assistance and working with PT. She is tolerating a regular diet. Denies nausea, vomiting, fever, chills, chest pain, shortness of breath, or any new or concerning symptoms.     MEDICATIONS  (STANDING):  acetaminophen   Tablet .. 650 milliGRAM(s) Oral every 6 hours  amLODIPine   Tablet 5 milliGRAM(s) Oral daily  aspirin  chewable 81 milliGRAM(s) Oral daily  clopidogrel Tablet 75 milliGRAM(s) Oral daily  dextrose 5%. 1000 milliLiter(s) (50 mL/Hr) IV Continuous <Continuous>  dextrose 50% Injectable 12.5 Gram(s) IV Push once  dextrose 50% Injectable 25 Gram(s) IV Push once  dextrose 50% Injectable 25 Gram(s) IV Push once  heparin  Injectable 5000 Unit(s) SubCutaneous every 8 hours  insulin lispro (HumaLOG) corrective regimen sliding scale   SubCutaneous three times a day before meals  insulin lispro (HumaLOG) corrective regimen sliding scale   SubCutaneous at bedtime  losartan 50 milliGRAM(s) Oral daily  multivitamin 1 Tablet(s) Oral daily  pantoprazole    Tablet 40 milliGRAM(s) Oral before breakfast  senna 2 Tablet(s) Oral at bedtime  simvastatin 40 milliGRAM(s) Oral at bedtime    MEDICATIONS  (PRN):  dextrose 40% Gel 15 Gram(s) Oral once PRN Blood Glucose LESS THAN 70 milliGRAM(s)/deciliter  glucagon  Injectable 1 milliGRAM(s) IntraMuscular once PRN Glucose LESS THAN 70 milligrams/deciliter  HYDROmorphone  Injectable 0.5 milliGRAM(s) IV Push every 6 hours PRN Breakthrough Pain  ondansetron Injectable 4 milliGRAM(s) IV Push every 6 hours PRN Nausea  oxyCODONE    IR 10 milliGRAM(s) Oral every 4 hours PRN Severe Pain (7 - 10)  traMADol 25 milliGRAM(s) Oral every 4 hours PRN Mild Pain (1 - 3)  traMADol 50 milliGRAM(s) Oral every 4 hours PRN Moderate Pain (4 - 6)      Vital Signs Last 24 Hrs  T(C): 36.8 (05 Jan 2020 23:42), Max: 36.8 (05 Jan 2020 19:48)  T(F): 98.2 (05 Jan 2020 23:42), Max: 98.3 (05 Jan 2020 19:48)  HR: 88 (05 Jan 2020 23:42) (72 - 88)  BP: 117/77 (05 Jan 2020 23:42) (100/65 - 119/75)  BP(mean): --  RR: 18 (05 Jan 2020 23:42) (16 - 18)  SpO2: 95% (05 Jan 2020 23:42) (93% - 97%)    Constitutional: patient resting comfortably in bed, in no acute distress  HEENT: EOMI, no active drainage or redness  Neck: Full ROM without pain  Respiratory: respirations are unlabored, no accessory muscle use, no conversational dyspnea  Cardiovascular: regular rate & rhythm  Gastrointestinal: Abdomen soft, non-tender, non-distended  Neurological: A&O x 3; no gross sensory / motor / coordination deficits  Ext: RLE dressing clean, dry, and intact. Doppler signal at PT and graft site. Graft site palpable       I&O's Detail    04 Jan 2020 07:01  -  05 Jan 2020 07:00  --------------------------------------------------------  IN:    Oral Fluid: 480 mL  Total IN: 480 mL    OUT:    Indwelling Catheter - Urethral: 1750 mL  Total OUT: 1750 mL    Total NET: -1270 mL      05 Jan 2020 07:01  -  06 Jan 2020 03:27  --------------------------------------------------------  IN:  Total IN: 0 mL    OUT:    Voided: 150 mL  Total OUT: 150 mL    Total NET: -150 mL          LABS:                        10.8   8.71  )-----------( 277      ( 05 Jan 2020 08:05 )             33.7     01-05    137  |  103  |  15.0  ----------------------------<  101  3.7   |  25.0  |  0.61    Ca    8.6      05 Jan 2020 08:05  Phos  2.6     01-05  Mg     1.8     01-05

## 2020-01-06 NOTE — OCCUPATIONAL THERAPY INITIAL EVALUATION ADULT - ASSISTIVE DEVICE FOR TRANSFER: SIT/STAND, REHAB EVAL
x10 trials with left hand on chair armrest and right hand on RW to stand as well as left LE at 90 degrees for support with sit-stand/rolling walker

## 2020-01-06 NOTE — OCCUPATIONAL THERAPY INITIAL EVALUATION ADULT - ADDITIONAL COMMENTS
Pt lives in house with 2 STEF and no steps inside for pt to negotiate; bedroom and bathroom are on main level. Bathroom has shower stall with doors. Pt owns cane. Pt is left handed. Pt does not drive. Pt's  works full-time as Psychiatrist (days and evenings) and pt's daughter works full-time as a Physician. Pt's father-in-law in 89 years old and unable to assist pt upon discharge. Pt's sister will be staying with pt upon discharge to provide assistance/supervision as needed.

## 2020-01-06 NOTE — DISCHARGE NOTE PROVIDER - HOSPITAL COURSE
Patient is a 70 y/o female c/o right great toe pain , patient has a nonhealing ulcer on her right great toe . Patient reports that she has had this ulcer for about 6 months and has had treatments with podiatry and wound care . She has also had previous RLE angiogram .Patient uses a cane for balance while walking . Patient underwent RLE angiogram with Dr Aguilar on  12/05/19 in which she underwent R TP trunk angioplasty and stenting. There was diffuse multivessel disease and attempts to recannulize via pedal access was aborted. The pt was to return for a right femoral posterior tibial artery with saphenous vein bypass. Pt taken to the OR on 1/3/20 by Dr Aguilar on (20 Dec 2019 10:20) and is S/P R pop PT bypass with RSVG. Periop course was unremarkable. Her post op course was uneventful. She was tolerating a diet and ambulating with a RW. Seen by PT and recommendations for home with PT. Pt is stable for dc home

## 2020-01-06 NOTE — OCCUPATIONAL THERAPY INITIAL EVALUATION ADULT - PLANNED THERAPY INTERVENTIONS, OT EVAL
bed mobility training/strengthening/toilet/balance training/ADL retraining/parent/caregiver training.../transfer training

## 2020-01-06 NOTE — DISCHARGE NOTE PROVIDER - NSDCACTIVITY_GEN_ALL_CORE
Showering allowed/No heavy lifting/straining/Stairs allowed/Walking - Indoors allowed/Walking - Outdoors allowed/Do not drive or operate machinery

## 2020-01-06 NOTE — DISCHARGE NOTE PROVIDER - NSDCFUADDINST_GEN_ALL_CORE_FT
May shower daily. Remove dressing prior. Wash incision with soap and water and pat dry. Leave open to air. No ointments, powders or creams to incision. Do not shave over incision x 3 weeks or until healed. Monitor incision for any redness, swelling or drainage and call office immediately with any concerns

## 2020-01-06 NOTE — DISCHARGE NOTE PROVIDER - NSDCMRMEDTOKEN_GEN_ALL_CORE_FT
acetaminophen 325 mg oral tablet: 2 tab(s) orally every 6 hours, As Needed  amLODIPine 5 mg oral tablet: 1 tab(s) orally once a day  aspirin 81 mg oral tablet: 1 tab(s) orally once a day  Calcium 600+D oral tablet: 1 tab(s) orally once a day  clopidogrel 75 mg oral tablet: 1 tab(s) orally once a day  Cod Liver Oil oral capsule: 1 cap(s) orally 2 times a day  Fish Oil 1000 mg oral capsule: 1 cap(s) orally once a day  folic acid 1 mg oral tablet: 1 tab(s) orally once a day  losartan 50 mg oral tablet: 1 tab(s) orally once a day  losartan-hydrochlorothiazide 50mg-12.5mg oral tablet: 1 tab(s) orally once a day  methotrexate 2.5 mg oral tablet: 6 tab(s) orally every 7 days  Multiple Vitamins oral tablet: 1 tab(s) orally once a day  Protonix 40 mg oral delayed release tablet: 1 tab(s) orally once a day  simvastatin 40 mg oral tablet: 1 tab(s) orally once a day (at bedtime)  traMADol 50 mg oral tablet: 1 tab(s) orally every 6 hours, As Needed -Moderate Pain (4 - 6) MDD:4  Vitamin D3 1000 intl units oral tablet: 1 tab(s) orally once a day acetaminophen 325 mg oral tablet: 2 tab(s) orally every 6 hours, As Needed  amLODIPine 5 mg oral tablet: 1 tab(s) orally once a day  aspirin 81 mg oral tablet: 1 tab(s) orally once a day  Calcium 600+D oral tablet: 1 tab(s) orally once a day  clopidogrel 75 mg oral tablet: 1 tab(s) orally once a day  Cod Liver Oil oral capsule: 1 cap(s) orally 2 times a day  Fish Oil 1000 mg oral capsule: 1 cap(s) orally once a day  folic acid 1 mg oral tablet: 1 tab(s) orally once a day  losartan-hydrochlorothiazide 50mg-12.5mg oral tablet: 1 tab(s) orally once a day  methotrexate 2.5 mg oral tablet: 6 tab(s) orally every 7 days  Multiple Vitamins oral tablet: 1 tab(s) orally once a day  Protonix 40 mg oral delayed release tablet: 1 tab(s) orally once a day  simvastatin 40 mg oral tablet: 1 tab(s) orally once a day (at bedtime)  traMADol 50 mg oral tablet: 1 tab(s) orally every 6 hours, As Needed -Moderate Pain (4 - 6) MDD:4  Vitamin D3 1000 intl units oral tablet: 1 tab(s) orally once a day acetaminophen 325 mg oral tablet: 2 tab(s) orally every 6 hours, As Needed  amLODIPine 5 mg oral tablet: 1 tab(s) orally once a day  aspirin 81 mg oral tablet: 1 tab(s) orally once a day  Calcium 600+D oral tablet: 1 tab(s) orally once a day  clopidogrel 75 mg oral tablet: 1 tab(s) orally once a day  Cod Liver Oil oral capsule: 1 cap(s) orally 2 times a day  DME: Rolling walker for pt safety  Dx: unsteady gait  ICD 10 26.81  DME: 3:1 commode for pt safety  Dx: unsteady gait  ICD 10 R 26.81  Fish Oil 1000 mg oral capsule: 1 cap(s) orally once a day  folic acid 1 mg oral tablet: 1 tab(s) orally once a day  losartan-hydrochlorothiazide 50mg-12.5mg oral tablet: 1 tab(s) orally once a day  methotrexate 2.5 mg oral tablet: 6 tab(s) orally every 7 days  Multiple Vitamins oral tablet: 1 tab(s) orally once a day  Protonix 40 mg oral delayed release tablet: 1 tab(s) orally once a day  simvastatin 40 mg oral tablet: 1 tab(s) orally once a day (at bedtime)  traMADol 50 mg oral tablet: 1 tab(s) orally every 6 hours, As Needed -Moderate Pain (4 - 6) MDD:4  Vitamin D3 1000 intl units oral tablet: 1 tab(s) orally once a day

## 2020-01-07 VITALS
RESPIRATION RATE: 18 BRPM | SYSTOLIC BLOOD PRESSURE: 109 MMHG | OXYGEN SATURATION: 93 % | DIASTOLIC BLOOD PRESSURE: 60 MMHG | HEART RATE: 90 BPM | TEMPERATURE: 98 F

## 2020-01-07 LAB — GLUCOSE BLDC GLUCOMTR-MCNC: 102 MG/DL — HIGH (ref 70–99)

## 2020-01-07 PROCEDURE — 36415 COLL VENOUS BLD VENIPUNCTURE: CPT

## 2020-01-07 PROCEDURE — 97535 SELF CARE MNGMENT TRAINING: CPT

## 2020-01-07 PROCEDURE — 97530 THERAPEUTIC ACTIVITIES: CPT

## 2020-01-07 PROCEDURE — 84100 ASSAY OF PHOSPHORUS: CPT

## 2020-01-07 PROCEDURE — 83735 ASSAY OF MAGNESIUM: CPT

## 2020-01-07 PROCEDURE — 97167 OT EVAL HIGH COMPLEX 60 MIN: CPT

## 2020-01-07 PROCEDURE — 97116 GAIT TRAINING THERAPY: CPT

## 2020-01-07 PROCEDURE — 82962 GLUCOSE BLOOD TEST: CPT

## 2020-01-07 PROCEDURE — 80048 BASIC METABOLIC PNL TOTAL CA: CPT

## 2020-01-07 PROCEDURE — 85027 COMPLETE CBC AUTOMATED: CPT

## 2020-01-07 PROCEDURE — 83036 HEMOGLOBIN GLYCOSYLATED A1C: CPT

## 2020-01-07 RX ADMIN — HEPARIN SODIUM 5000 UNIT(S): 5000 INJECTION INTRAVENOUS; SUBCUTANEOUS at 05:43

## 2020-01-07 RX ADMIN — PANTOPRAZOLE SODIUM 40 MILLIGRAM(S): 20 TABLET, DELAYED RELEASE ORAL at 05:43

## 2020-01-07 RX ADMIN — SODIUM CHLORIDE 3 MILLILITER(S): 9 INJECTION INTRAMUSCULAR; INTRAVENOUS; SUBCUTANEOUS at 05:45

## 2020-01-07 RX ADMIN — Medication 650 MILLIGRAM(S): at 00:00

## 2020-01-07 RX ADMIN — Medication 650 MILLIGRAM(S): at 06:40

## 2020-01-07 RX ADMIN — AMLODIPINE BESYLATE 5 MILLIGRAM(S): 2.5 TABLET ORAL at 05:43

## 2020-01-07 RX ADMIN — Medication 650 MILLIGRAM(S): at 05:43

## 2020-01-07 RX ADMIN — LOSARTAN POTASSIUM 50 MILLIGRAM(S): 100 TABLET, FILM COATED ORAL at 05:43

## 2020-01-07 NOTE — PROGRESS NOTE ADULT - ASSESSMENT
70 y/o female s/p open above knee popliteal to posterior tibia bypass with saphenous vein harvest. POD # 4  Patient continues to do well post operatively with well controlled pain. Working with PT and ambulating with assistance. Graft with palpable pulse and PT signal distal to graft.    - Continue ASA, plavix  - Pain control w/ current PO regimen  - Physical therapy to continue as outpt  - Home today

## 2020-01-07 NOTE — PROGRESS NOTE ADULT - SUBJECTIVE AND OBJECTIVE BOX
HPI/OVERNIGHT EVENTS:  No acute overnight events. Ambulating with RW and working with PT. Family training in progress. She is tolerating a regular diet. Denies nausea, vomiting, fever, chills, chest pain, shortness of breath, or any new or concerning symptoms.     MEDICATIONS  (STANDING):  acetaminophen   Tablet .. 650 milliGRAM(s) Oral every 6 hours  amLODIPine   Tablet 5 milliGRAM(s) Oral daily  aspirin  chewable 81 milliGRAM(s) Oral daily  clopidogrel Tablet 75 milliGRAM(s) Oral daily  dextrose 5%. 1000 milliLiter(s) (50 mL/Hr) IV Continuous <Continuous>  dextrose 50% Injectable 12.5 Gram(s) IV Push once  dextrose 50% Injectable 25 Gram(s) IV Push once  dextrose 50% Injectable 25 Gram(s) IV Push once  heparin  Injectable 5000 Unit(s) SubCutaneous every 8 hours  insulin lispro (HumaLOG) corrective regimen sliding scale   SubCutaneous three times a day before meals  insulin lispro (HumaLOG) corrective regimen sliding scale   SubCutaneous at bedtime  losartan 50 milliGRAM(s) Oral daily  multivitamin 1 Tablet(s) Oral daily  pantoprazole    Tablet 40 milliGRAM(s) Oral before breakfast  senna 2 Tablet(s) Oral at bedtime  simvastatin 40 milliGRAM(s) Oral at bedtime  sodium chloride 0.9% lock flush 3 milliLiter(s) IV Push every 8 hours    MEDICATIONS  (PRN):  dextrose 40% Gel 15 Gram(s) Oral once PRN Blood Glucose LESS THAN 70 milliGRAM(s)/deciliter  glucagon  Injectable 1 milliGRAM(s) IntraMuscular once PRN Glucose LESS THAN 70 milligrams/deciliter  HYDROmorphone  Injectable 0.5 milliGRAM(s) IV Push every 6 hours PRN Breakthrough Pain  ondansetron Injectable 4 milliGRAM(s) IV Push every 6 hours PRN Nausea  oxyCODONE    IR 10 milliGRAM(s) Oral every 4 hours PRN Severe Pain (7 - 10)  traMADol 25 milliGRAM(s) Oral every 4 hours PRN Mild Pain (1 - 3)  traMADol 50 milliGRAM(s) Oral every 4 hours PRN Moderate Pain (4 - 6)    Vital Signs Last 24 Hrs  T(C): 36.7 (07 Jan 2020 04:48), Max: 36.8 (06 Jan 2020 07:57)  T(F): 98.1 (07 Jan 2020 04:48), Max: 98.3 (06 Jan 2020 23:52)  HR: 98 (07 Jan 2020 04:48) (84 - 98)  BP: 113/76 (07 Jan 2020 04:48) (112/71 - 136/86)  BP(mean): --  RR: 18 (07 Jan 2020 04:48) (18 - 18)  SpO2: 91% (07 Jan 2020 04:48) (91% - 94%)    Constitutional: patient resting comfortably in bed, in no acute distress  Ext: RLE incision inner thigh with dermbond-no drainage, erythema. Distal PT incision with mild edema and incision with sutures CHANNING without drainage or erythema. + PT and DP signal. Motor and sensory intact         LABS:                         11.0   9.40  )-----------( 298      ( 06 Jan 2020 05:41 )             34.6   01-06    139  |  105  |  14.0  ----------------------------<  95  4.0   |  23.0  |  0.51    Ca    8.7      06 Jan 2020 05:41  Phos  3.1     01-06  Mg     1.8     01-06

## 2020-01-08 ENCOUNTER — MESSAGE (OUTPATIENT)
Age: 70
End: 2020-01-08

## 2020-01-14 PROBLEM — E78.5 HYPERLIPIDEMIA, UNSPECIFIED: Chronic | Status: ACTIVE | Noted: 2019-11-29

## 2020-01-14 PROBLEM — I10 ESSENTIAL (PRIMARY) HYPERTENSION: Chronic | Status: ACTIVE | Noted: 2019-11-29

## 2020-01-14 PROBLEM — I73.9 PERIPHERAL VASCULAR DISEASE, UNSPECIFIED: Chronic | Status: ACTIVE | Noted: 2019-11-29

## 2020-01-14 PROBLEM — L97.509 NON-PRESSURE CHRONIC ULCER OF OTHER PART OF UNSPECIFIED FOOT WITH UNSPECIFIED SEVERITY: Chronic | Status: ACTIVE | Noted: 2019-11-29

## 2020-01-14 PROBLEM — M06.9 RHEUMATOID ARTHRITIS, UNSPECIFIED: Chronic | Status: ACTIVE | Noted: 2019-11-29

## 2020-01-14 PROBLEM — Z87.09 PERSONAL HISTORY OF OTHER DISEASES OF THE RESPIRATORY SYSTEM: Chronic | Status: ACTIVE | Noted: 2019-11-29

## 2020-01-21 ENCOUNTER — APPOINTMENT (OUTPATIENT)
Dept: VASCULAR SURGERY | Facility: CLINIC | Age: 70
End: 2020-01-21
Payer: MEDICARE

## 2020-01-21 VITALS
DIASTOLIC BLOOD PRESSURE: 81 MMHG | SYSTOLIC BLOOD PRESSURE: 130 MMHG | HEART RATE: 101 BPM | HEIGHT: 59 IN | OXYGEN SATURATION: 96 % | BODY MASS INDEX: 30.64 KG/M2 | TEMPERATURE: 97.9 F | WEIGHT: 152 LBS

## 2020-01-21 PROCEDURE — 99214 OFFICE O/P EST MOD 30 MIN: CPT

## 2020-01-27 ENCOUNTER — APPOINTMENT (OUTPATIENT)
Dept: VASCULAR SURGERY | Facility: CLINIC | Age: 70
End: 2020-01-27
Payer: MEDICARE

## 2020-01-27 VITALS
OXYGEN SATURATION: 96 % | TEMPERATURE: 98 F | SYSTOLIC BLOOD PRESSURE: 112 MMHG | HEART RATE: 126 BPM | DIASTOLIC BLOOD PRESSURE: 72 MMHG | HEIGHT: 59 IN

## 2020-01-27 PROCEDURE — 99024 POSTOP FOLLOW-UP VISIT: CPT

## 2020-01-30 ENCOUNTER — INPATIENT (INPATIENT)
Facility: HOSPITAL | Age: 70
LOS: 0 days | Discharge: HOME | DRG: 921 | End: 2020-01-31
Attending: SURGERY | Admitting: SURGERY
Payer: COMMERCIAL

## 2020-01-30 VITALS
SYSTOLIC BLOOD PRESSURE: 10 MMHG | RESPIRATION RATE: 18 BRPM | HEART RATE: 72 BPM | OXYGEN SATURATION: 94 % | DIASTOLIC BLOOD PRESSURE: 68 MMHG | TEMPERATURE: 98 F

## 2020-01-30 DIAGNOSIS — T81.89XA OTHER COMPLICATIONS OF PROCEDURES, NOT ELSEWHERE CLASSIFIED, INITIAL ENCOUNTER: ICD-10-CM

## 2020-01-30 DIAGNOSIS — Z98.891 HISTORY OF UTERINE SCAR FROM PREVIOUS SURGERY: Chronic | ICD-10-CM

## 2020-01-30 DIAGNOSIS — Z96.651 PRESENCE OF RIGHT ARTIFICIAL KNEE JOINT: Chronic | ICD-10-CM

## 2020-01-30 DIAGNOSIS — Z98.89 OTHER SPECIFIED POSTPROCEDURAL STATES: Chronic | ICD-10-CM

## 2020-01-30 RX ORDER — ACETAMINOPHEN 500 MG
650 TABLET ORAL EVERY 6 HOURS
Refills: 0 | Status: DISCONTINUED | OUTPATIENT
Start: 2020-01-30 | End: 2020-01-31

## 2020-01-30 RX ORDER — LOSARTAN POTASSIUM 100 MG/1
50 TABLET, FILM COATED ORAL DAILY
Refills: 0 | Status: DISCONTINUED | OUTPATIENT
Start: 2020-01-30 | End: 2020-01-31

## 2020-01-30 RX ORDER — COLLAGENASE CLOSTRIDIUM HIST. 250 UNIT/G
1 OINTMENT (GRAM) TOPICAL DAILY
Refills: 0 | Status: DISCONTINUED | OUTPATIENT
Start: 2020-01-30 | End: 2020-01-31

## 2020-01-30 RX ORDER — HEPARIN SODIUM 5000 [USP'U]/ML
5000 INJECTION INTRAVENOUS; SUBCUTANEOUS EVERY 8 HOURS
Refills: 0 | Status: DISCONTINUED | OUTPATIENT
Start: 2020-01-30 | End: 2020-01-31

## 2020-01-30 RX ORDER — METHOTREXATE 2.5 MG/1
17.5 TABLET ORAL
Refills: 0 | Status: DISCONTINUED | OUTPATIENT
Start: 2020-01-30 | End: 2020-01-30

## 2020-01-30 RX ORDER — HYDROCHLOROTHIAZIDE 25 MG
12.5 TABLET ORAL DAILY
Refills: 0 | Status: DISCONTINUED | OUTPATIENT
Start: 2020-01-30 | End: 2020-01-31

## 2020-01-30 RX ORDER — CLOPIDOGREL BISULFATE 75 MG/1
75 TABLET, FILM COATED ORAL DAILY
Refills: 0 | Status: DISCONTINUED | OUTPATIENT
Start: 2020-01-30 | End: 2020-01-31

## 2020-01-30 RX ORDER — AMLODIPINE BESYLATE 2.5 MG/1
5 TABLET ORAL DAILY
Refills: 0 | Status: DISCONTINUED | OUTPATIENT
Start: 2020-01-30 | End: 2020-01-31

## 2020-01-30 RX ORDER — ASPIRIN/CALCIUM CARB/MAGNESIUM 324 MG
81 TABLET ORAL DAILY
Refills: 0 | Status: DISCONTINUED | OUTPATIENT
Start: 2020-01-30 | End: 2020-01-31

## 2020-01-30 RX ORDER — SIMVASTATIN 20 MG/1
40 TABLET, FILM COATED ORAL AT BEDTIME
Refills: 0 | Status: DISCONTINUED | OUTPATIENT
Start: 2020-01-30 | End: 2020-01-31

## 2020-01-30 RX ORDER — OXYCODONE HYDROCHLORIDE 5 MG/1
5 TABLET ORAL EVERY 6 HOURS
Refills: 0 | Status: DISCONTINUED | OUTPATIENT
Start: 2020-01-30 | End: 2020-01-31

## 2020-01-30 RX ORDER — GABAPENTIN 400 MG/1
300 CAPSULE ORAL THREE TIMES A DAY
Refills: 0 | Status: DISCONTINUED | OUTPATIENT
Start: 2020-01-30 | End: 2020-01-31

## 2020-01-30 RX ORDER — SODIUM HYPOCHLORITE 0.125 %
1 SOLUTION, NON-ORAL MISCELLANEOUS DAILY
Refills: 0 | Status: DISCONTINUED | OUTPATIENT
Start: 2020-01-30 | End: 2020-01-31

## 2020-01-30 RX ORDER — PANTOPRAZOLE SODIUM 20 MG/1
40 TABLET, DELAYED RELEASE ORAL
Refills: 0 | Status: DISCONTINUED | OUTPATIENT
Start: 2020-01-30 | End: 2020-01-31

## 2020-01-30 RX ADMIN — Medication 81 MILLIGRAM(S): at 14:39

## 2020-01-30 RX ADMIN — HEPARIN SODIUM 5000 UNIT(S): 5000 INJECTION INTRAVENOUS; SUBCUTANEOUS at 21:43

## 2020-01-30 RX ADMIN — Medication 1 APPLICATION(S): at 10:30

## 2020-01-30 RX ADMIN — OXYCODONE HYDROCHLORIDE 5 MILLIGRAM(S): 5 TABLET ORAL at 22:10

## 2020-01-30 RX ADMIN — GABAPENTIN 300 MILLIGRAM(S): 400 CAPSULE ORAL at 21:43

## 2020-01-30 RX ADMIN — SIMVASTATIN 40 MILLIGRAM(S): 20 TABLET, FILM COATED ORAL at 21:42

## 2020-01-30 RX ADMIN — CLOPIDOGREL BISULFATE 75 MILLIGRAM(S): 75 TABLET, FILM COATED ORAL at 14:39

## 2020-01-30 RX ADMIN — OXYCODONE HYDROCHLORIDE 5 MILLIGRAM(S): 5 TABLET ORAL at 21:41

## 2020-01-30 RX ADMIN — HEPARIN SODIUM 5000 UNIT(S): 5000 INJECTION INTRAVENOUS; SUBCUTANEOUS at 14:40

## 2020-01-30 NOTE — H&P ADULT - ASSESSMENT
70y f, s/p pop-tib bypass on 1/3. Admitted for R inner thigh wound management.   -will admit for wound care management, copiously irrigate wound. Placed wet the dry dressing, wound vac ordered. No signs of infection.   -continue asa and plavix  -dvt ppx - subq heparin  -reg diet  -continue home meds  -dispo planning after arrival of home wound vac  -seen with Dr. Young

## 2020-01-30 NOTE — H&P ADULT - HISTORY OF PRESENT ILLNESS
Patient is a 69yF PMHx RA, PAD, HTN, s/p pop-tib bypass on 1/3 with Dr. Walter. Patient has been following up as outpatient for poorly healing right thigh incision wound. She was being treated with local wound care, but has been unsuccessful. Patient presented to hospital for further management of the right groin. Pt has no complaints, no leg pain, numbness/tingling, temp changes in leg. She has been ambulating at home.   Denies co, sob, n/v/d, f/c.

## 2020-01-30 NOTE — H&P ADULT - NSICDXPASTMEDICALHX_GEN_ALL_CORE_FT
PAST MEDICAL HISTORY:  Chronic ulcer of toe right great toe    Claudication     H/O bronchiectasis     HLD (hyperlipidemia)     HTN (hypertension)     Hypertension     PAD (peripheral artery disease)     Rheumatoid arthritis     Rheumatoid arthritis

## 2020-01-30 NOTE — H&P ADULT - NSHPPHYSICALEXAM_GEN_ALL_CORE
PHYSICAL EXAM:      Constitutional: NAD, pleasant female, laying comfortably  Respiratory: non-labored , no accessory muscle use, no conversational dyspnea  Cardiovascular: normal s1/s2  Gastrointestinal: soft, NTND  Genitourinary: voiding independently   Vascular:  RLE incision inner thigh - approx 4 x 8 x 3 cm deep. no purulent drainage or bleeding. Wound with granulation tissue. wound base pink.   Distal PT incision without drainage or erythema, healing well. Motor and sensory intact . 2 + palpable pulse over bypass  Neurological: aaox3, sensations intact

## 2020-01-31 ENCOUNTER — TRANSCRIPTION ENCOUNTER (OUTPATIENT)
Age: 70
End: 2020-01-31

## 2020-01-31 VITALS
OXYGEN SATURATION: 93 % | TEMPERATURE: 98 F | SYSTOLIC BLOOD PRESSURE: 118 MMHG | DIASTOLIC BLOOD PRESSURE: 79 MMHG | RESPIRATION RATE: 18 BRPM | HEART RATE: 81 BPM

## 2020-01-31 LAB
ANION GAP SERPL CALC-SCNC: 12 MMOL/L — SIGNIFICANT CHANGE UP (ref 5–17)
BUN SERPL-MCNC: 19 MG/DL — SIGNIFICANT CHANGE UP (ref 8–20)
CALCIUM SERPL-MCNC: 10 MG/DL — SIGNIFICANT CHANGE UP (ref 8.6–10.2)
CHLORIDE SERPL-SCNC: 98 MMOL/L — SIGNIFICANT CHANGE UP (ref 98–107)
CO2 SERPL-SCNC: 27 MMOL/L — SIGNIFICANT CHANGE UP (ref 22–29)
CREAT SERPL-MCNC: 0.68 MG/DL — SIGNIFICANT CHANGE UP (ref 0.5–1.3)
GLUCOSE SERPL-MCNC: 98 MG/DL — SIGNIFICANT CHANGE UP (ref 70–99)
HCT VFR BLD CALC: 38.2 % — SIGNIFICANT CHANGE UP (ref 34.5–45)
HCV AB S/CO SERPL IA: 0.15 S/CO — SIGNIFICANT CHANGE UP (ref 0–0.99)
HCV AB SERPL-IMP: SIGNIFICANT CHANGE UP
HGB BLD-MCNC: 12.2 G/DL — SIGNIFICANT CHANGE UP (ref 11.5–15.5)
MCHC RBC-ENTMCNC: 28.8 PG — SIGNIFICANT CHANGE UP (ref 27–34)
MCHC RBC-ENTMCNC: 31.9 GM/DL — LOW (ref 32–36)
MCV RBC AUTO: 90.3 FL — SIGNIFICANT CHANGE UP (ref 80–100)
PLATELET # BLD AUTO: 399 K/UL — SIGNIFICANT CHANGE UP (ref 150–400)
POTASSIUM SERPL-MCNC: 4.7 MMOL/L — SIGNIFICANT CHANGE UP (ref 3.5–5.3)
POTASSIUM SERPL-SCNC: 4.7 MMOL/L — SIGNIFICANT CHANGE UP (ref 3.5–5.3)
RBC # BLD: 4.23 M/UL — SIGNIFICANT CHANGE UP (ref 3.8–5.2)
RBC # FLD: 16.3 % — HIGH (ref 10.3–14.5)
SODIUM SERPL-SCNC: 137 MMOL/L — SIGNIFICANT CHANGE UP (ref 135–145)
WBC # BLD: 7.14 K/UL — SIGNIFICANT CHANGE UP (ref 3.8–10.5)
WBC # FLD AUTO: 7.14 K/UL — SIGNIFICANT CHANGE UP (ref 3.8–10.5)

## 2020-01-31 PROCEDURE — 86803 HEPATITIS C AB TEST: CPT

## 2020-01-31 PROCEDURE — G0378: CPT

## 2020-01-31 PROCEDURE — 85027 COMPLETE CBC AUTOMATED: CPT

## 2020-01-31 PROCEDURE — 36415 COLL VENOUS BLD VENIPUNCTURE: CPT

## 2020-01-31 PROCEDURE — 80048 BASIC METABOLIC PNL TOTAL CA: CPT

## 2020-01-31 PROCEDURE — 99239 HOSP IP/OBS DSCHRG MGMT >30: CPT

## 2020-01-31 RX ORDER — GABAPENTIN 400 MG/1
3 CAPSULE ORAL
Qty: 0 | Refills: 0 | DISCHARGE
Start: 2020-01-31

## 2020-01-31 RX ORDER — GABAPENTIN 400 MG/1
1 CAPSULE ORAL
Qty: 0 | Refills: 0 | DISCHARGE
Start: 2020-01-31

## 2020-01-31 RX ORDER — TRAMADOL HYDROCHLORIDE 50 MG/1
1 TABLET ORAL
Qty: 28 | Refills: 0
Start: 2020-01-31 | End: 2020-02-06

## 2020-01-31 RX ORDER — PANTOPRAZOLE SODIUM 20 MG/1
1 TABLET, DELAYED RELEASE ORAL
Qty: 30 | Refills: 3
Start: 2020-01-31 | End: 2020-05-29

## 2020-01-31 RX ADMIN — GABAPENTIN 300 MILLIGRAM(S): 400 CAPSULE ORAL at 13:00

## 2020-01-31 RX ADMIN — HEPARIN SODIUM 5000 UNIT(S): 5000 INJECTION INTRAVENOUS; SUBCUTANEOUS at 13:01

## 2020-01-31 RX ADMIN — Medication 1 APPLICATION(S): at 12:00

## 2020-01-31 RX ADMIN — LOSARTAN POTASSIUM 50 MILLIGRAM(S): 100 TABLET, FILM COATED ORAL at 05:49

## 2020-01-31 RX ADMIN — OXYCODONE HYDROCHLORIDE 5 MILLIGRAM(S): 5 TABLET ORAL at 04:27

## 2020-01-31 RX ADMIN — OXYCODONE HYDROCHLORIDE 5 MILLIGRAM(S): 5 TABLET ORAL at 11:00

## 2020-01-31 RX ADMIN — Medication 81 MILLIGRAM(S): at 13:00

## 2020-01-31 RX ADMIN — Medication 12.5 MILLIGRAM(S): at 05:48

## 2020-01-31 RX ADMIN — PANTOPRAZOLE SODIUM 40 MILLIGRAM(S): 20 TABLET, DELAYED RELEASE ORAL at 09:59

## 2020-01-31 RX ADMIN — OXYCODONE HYDROCHLORIDE 5 MILLIGRAM(S): 5 TABLET ORAL at 10:02

## 2020-01-31 RX ADMIN — GABAPENTIN 300 MILLIGRAM(S): 400 CAPSULE ORAL at 05:48

## 2020-01-31 RX ADMIN — HEPARIN SODIUM 5000 UNIT(S): 5000 INJECTION INTRAVENOUS; SUBCUTANEOUS at 05:45

## 2020-01-31 RX ADMIN — CLOPIDOGREL BISULFATE 75 MILLIGRAM(S): 75 TABLET, FILM COATED ORAL at 13:00

## 2020-01-31 RX ADMIN — AMLODIPINE BESYLATE 5 MILLIGRAM(S): 2.5 TABLET ORAL at 05:48

## 2020-01-31 NOTE — PROGRESS NOTE ADULT - SUBJECTIVE AND OBJECTIVE BOX
INTERVAL HPI/OVERNIGHT EVENTS/SUBJECTIVE:  Pt with R thigh incisional wound dehiscence. Admitted for wound care. Pt seen and examined. Dressing removed, copiously irrigated, wound vac placed.   Offers no complaints at this time. Pain well controlled. Ambulating with assistance.   Denies cp, sob, n/v/d, abd pain, numbness/tingling, temp changes in extremities.     ICU Vital Signs Last 24 Hrs  T(C): 36.5 (31 Jan 2020 08:37), Max: 36.8 (31 Jan 2020 05:39)  T(F): 97.7 (31 Jan 2020 08:37), Max: 98.3 (31 Jan 2020 05:39)  HR: 81 (31 Jan 2020 08:37) (72 - 85)  BP: 118/79 (31 Jan 2020 08:37) (10/68 - 130/79)  BP(mean): --  ABP: --  ABP(mean): --  RR: 18 (31 Jan 2020 08:37) (18 - 19)  SpO2: 93% (31 Jan 2020 08:37) (93% - 96%)    MEDICATIONS  (STANDING):  amLODIPine   Tablet 5 milliGRAM(s) Oral daily  aspirin enteric coated 81 milliGRAM(s) Oral daily  clopidogrel Tablet 75 milliGRAM(s) Oral daily  collagenase Ointment 1 Application(s) Topical daily  Dakins Solution - 1/4 Strength 1 Application(s) Topical daily  gabapentin 300 milliGRAMs) Oral three times a day  heparin  Injectable 5000 Unit(s) SubCutaneous every 8 hours  hydrochlorothiazide 12.5 milliGRAM(s) Oral daily  losartan 50 milliGRAM(s) Oral daily  pantoprazole    Tablet 40 milliGRAM(s) Oral before breakfast  simvastatin 40 milliGRAM(s) Oral at bedtime    MEDICATIONS  (PRN):  acetaminophen   Tablet .. 650 milliGRAM(s) Oral every 6 hours PRN Mild Pain (1 - 3)  oxyCODONE    IR 5 milliGRAM(s) Oral every 6 hours PRN Moderate Pain (4 - 6)    MISC:     PHYSICAL EXAM:  Constitutional: NAD, pleasant female, laying comfortably  	Respiratory: non-labored , no accessory muscle use, no conversational dyspnea  	Cardiovascular: normal s1/s2  	Gastrointestinal: soft, NTND  	Genitourinary: voiding independently   	Vascular:  	RLE incision inner thigh - approx 4 x 6 x 3 cm deep. no purulent drainage or bleeding. Wound with granulation tissue. wound base pink.  	 Distal PT incision without drainage or erythema, healing well. Motor and sensory intact . 2 + palpable pulse over bypass  Neurological: aaox3, sensations intact    LABS:  CBC Full  -  ( 31 Jan 2020 07:56 )  WBC Count : 7.14 K/uL  RBC Count : 4.23 M/uL  Hemoglobin : 12.2 g/dL  Hematocrit : 38.2 %  Platelet Count - Automated : 399 K/uL  Mean Cell Volume : 90.3 fl  Mean Cell Hemoglobin : 28.8 pg  Mean Cell Hemoglobin Concentration : 31.9 gm/dL  Auto Neutrophil # : x  Auto Lymphocyte # : x  Auto Monocyte # : x  Auto Eosinophil # : x  Auto Basophil # : x  Auto Neutrophil % : x  Auto Lymphocyte % : x  Auto Monocyte % : x  Auto Eosinophil % : x  Auto Basophil % : x    01-31    137  |  98  |  19.0  ----------------------------<  98  4.7   |  27.0  |  0.68    Ca    10.0      31 Jan 2020 07:56    ASSESSMENT/PLAN:  69yFemale s/p pop-tib bypass on 1/3. Admitted for R inner thigh wound management.   - dressing was removed today - wound was copiously irrigated with NS and dakins.  Wound vac was placed. Next wound vasc change monday 2/3/20  -continue asa and plavix  -dvt ppx - subq heparin  -reg diet  -continue home meds  -d/c today

## 2020-01-31 NOTE — DISCHARGE NOTE PROVIDER - CARE PROVIDER_API CALL
ManvarSingh, Pallavi B (MD)  Vascular Surgery  [address] [place] [address], [address] [place] [address]  Phone: [phone]  Fax: [phone]  Follow Up Time:

## 2020-01-31 NOTE — DISCHARGE NOTE PROVIDER - HOSPITAL COURSE
1/30/20 - Patient is a 69yF PMHx RA, PAD, HTN, s/p pop-tib bypass on 1/3 with Dr. Walter. Patient has been following up as outpatient for poorly healing right thigh incision wound. She was being treated with local wound care, but has been unsuccessful. Patient presented to hospital for further management of the right groin. Pt has no complaints, no leg pain, numbness/tingling, temp changes in leg.    Denies co, sob, n/v/d, f/c.      Hospital course was uneventful, wound care was performed, she received her home wound vac and was placed bedside. Pt ambulating with assist, tolerating diet, voiding independently, has remained HD stable and afebrile.     Per attending, safe for discharge home. She will go home with home owund vac, and has nursing set up to perform q3 day wound vac changes.     Will follow-up with Dr. Walter in 2 weeks for wound check. 1/30/20 - Patient is a 69yF PMHx RA, PAD, HTN, s/p pop-tib bypass on 1/3 with Dr. Walter. Patient has been following up as outpatient for poorly healing right thigh incision wound. She was being treated with local wound care, but has been unsuccessful. Patient presented to hospital for further management of the right groin. Pt has no complaints, no leg pain, numbness/tingling, temp changes in leg.    Denies co, sob, n/v/d, f/c.      Hospital course was uneventful, wound care was performed, she received her home wound vac and was placed bedside. Pt ambulating with assist, tolerating diet, voiding independently, has remained HD stable and afebrile.     Per attending, safe for discharge home. She will go home with home owund vac, and has nursing set up to perform q3 day wound vac changes.     Will follow-up with Dr. Walter in 2 weeks for wound check.         Greater than 30 min spent on discharge

## 2020-01-31 NOTE — DISCHARGE NOTE NURSING/CASE MANAGEMENT/SOCIAL WORK - PATIENT PORTAL LINK FT
You can access the FollowMyHealth Patient Portal offered by [facility] by registering at the following website: [email or web address]. By joining [employer]’s FollowMyHealth portal, you will also be able to view your health information using other applications (apps) compatible with our system.

## 2020-01-31 NOTE — DISCHARGE NOTE NURSING/CASE MANAGEMENT/SOCIAL WORK - NSDCFUADDAPPT_GEN_ALL_CORE_FT
Please follow up with your primary care physician: Dr Gutierrez Salmon in [place] [phone]  Please follow up with your vascular surgeon: Dr Pallavi Manvar-Singh in [place] [phone]  The pt is using [facility] at [facility] [phone], and the pt has no difficulties   in obtaining her prescriptions/medications.

## 2020-01-31 NOTE — DISCHARGE NOTE PROVIDER - NSDCFUSCHEDAPPT_GEN_ALL_CORE_FT
ROSAURA DODSON ; 02/05/2020 ; [facility] [address] [facility] ROSAURA DODSON ; 02/05/2020 ; [facility] [address] [facility] ROSAURA DODSON ; 02/05/2020 ; [facility] [address] [facility]

## 2020-01-31 NOTE — DISCHARGE NOTE PROVIDER - NSDCFUADDINST_GEN_ALL_CORE_FT
WOUND CARE: Your visiting nurse will change your wound vac 3 times a week (monday, wednesday, Friday).   BATHING: Please do not submerge wound underwater. You may shower and/or sponge bathe. You should shower on days where you are having wound vac changes, prior to the change. You should wash the would with soap and water.   ACTIVITY: f you are taking narcotic pain medication,  DO NOT drive a car, operate machinery, drink alcohol, or make important decisions.  DIET: Return to your usual diet.  NOTIFY YOUR SURGEON IF: You have any bleeding that does not stop, any pus draining from your wound(s), any fever (over 100.4 F) or chills, persistent nausea/vomiting, persistent diarrhea, chest pain, shortness of breath, or if your pain is not controlled on your discharge pain medications.  FOLLOW-UP: Please follow up with Dr. Walter in 2 weeks. Call for appointment upon discharge.

## 2020-01-31 NOTE — DISCHARGE NOTE PROVIDER - NSDCMRMEDTOKEN_GEN_ALL_CORE_FT
acetaminophen 325 mg oral tablet: 2 tab(s) orally every 6 hours, As Needed  amLODIPine 5 mg oral tablet: 1 tab(s) orally once a day  aspirin 81 mg oral tablet: 1 tab(s) orally once a day  Calcium 600+D oral tablet: 1 tab(s) orally once a day  clopidogrel 75 mg oral tablet: 1 tab(s) orally once a day  Cod Liver Oil oral capsule: 1 cap(s) orally 2 times a day  Fish Oil 1000 mg oral capsule: 1 cap(s) orally once a day  folic acid 1 mg oral tablet: 1 tab(s) orally once a day  gabapentin 300 mg oral capsule: 1 cap(s) orally 3 times a day  losartan-hydrochlorothiazide 50mg-12.5mg oral tablet: 1 tab(s) orally once a day  methotrexate 2.5 mg oral tablet: 6 tab(s) orally every 7 days  Multiple Vitamins oral tablet: 1 tab(s) orally once a day  Protonix 40 mg oral delayed release tablet: 1 tab(s) orally once a day  Protonix 40 mg oral delayed release tablet: 1 tab(s) orally once a day   simvastatin 40 mg oral tablet: 1 tab(s) orally once a day (at bedtime)  traMADol 50 mg oral tablet: 1 tab(s) orally every 6 hours, As Needed -Moderate Pain (4 - 6) MDD:4  Vitamin D3 1000 intl units oral tablet: 1 tab(s) orally once a day

## 2020-02-05 ENCOUNTER — APPOINTMENT (OUTPATIENT)
Dept: VASCULAR SURGERY | Facility: CLINIC | Age: 70
End: 2020-02-05

## 2020-02-14 ENCOUNTER — APPOINTMENT (OUTPATIENT)
Dept: VASCULAR SURGERY | Facility: CLINIC | Age: 70
End: 2020-02-14
Payer: MEDICARE

## 2020-02-14 VITALS
OXYGEN SATURATION: 96 % | SYSTOLIC BLOOD PRESSURE: 112 MMHG | HEIGHT: 59 IN | DIASTOLIC BLOOD PRESSURE: 73 MMHG | HEART RATE: 98 BPM | TEMPERATURE: 98.1 F

## 2020-02-14 PROCEDURE — 99024 POSTOP FOLLOW-UP VISIT: CPT

## 2020-02-17 RX ORDER — AMLODIPINE BESYLATE 5 MG/1
5 TABLET ORAL
Refills: 0 | Status: DISCONTINUED | COMMUNITY
End: 2020-02-17

## 2020-02-28 ENCOUNTER — APPOINTMENT (OUTPATIENT)
Dept: VASCULAR SURGERY | Facility: CLINIC | Age: 70
End: 2020-02-28
Payer: MEDICARE

## 2020-02-28 PROCEDURE — 99024 POSTOP FOLLOW-UP VISIT: CPT

## 2020-09-04 ENCOUNTER — APPOINTMENT (OUTPATIENT)
Dept: VASCULAR SURGERY | Facility: CLINIC | Age: 70
End: 2020-09-04

## 2020-09-08 ENCOUNTER — APPOINTMENT (OUTPATIENT)
Dept: VASCULAR SURGERY | Facility: CLINIC | Age: 70
End: 2020-09-08
Payer: MEDICARE

## 2020-09-08 VITALS
HEART RATE: 98 BPM | DIASTOLIC BLOOD PRESSURE: 87 MMHG | WEIGHT: 162 LBS | HEIGHT: 59 IN | TEMPERATURE: 98 F | OXYGEN SATURATION: 98 % | SYSTOLIC BLOOD PRESSURE: 123 MMHG | BODY MASS INDEX: 32.66 KG/M2

## 2020-09-08 PROCEDURE — 93926 LOWER EXTREMITY STUDY: CPT

## 2020-09-08 PROCEDURE — 99214 OFFICE O/P EST MOD 30 MIN: CPT

## 2020-09-09 ENCOUNTER — APPOINTMENT (OUTPATIENT)
Dept: VASCULAR SURGERY | Facility: CLINIC | Age: 70
End: 2020-09-09
Payer: COMMERCIAL

## 2020-09-09 VITALS
DIASTOLIC BLOOD PRESSURE: 85 MMHG | OXYGEN SATURATION: 97 % | BODY MASS INDEX: 32.66 KG/M2 | HEART RATE: 87 BPM | TEMPERATURE: 98.7 F | SYSTOLIC BLOOD PRESSURE: 129 MMHG | WEIGHT: 162 LBS | HEIGHT: 59 IN

## 2020-09-09 PROCEDURE — 99213 OFFICE O/P EST LOW 20 MIN: CPT

## 2020-09-10 DIAGNOSIS — Z01.818 ENCOUNTER FOR OTHER PREPROCEDURAL EXAMINATION: ICD-10-CM

## 2020-09-10 NOTE — ASU PREOP CHECKLIST - BSA (M2)
Patient Seen in: [facility] In [address] [place] [address]      History   Patient presents with: Foot Pain    Stated Complaint: Stepped on nail with L Foot    HPI    Pt is a 48 yo who stepped on a nail approx 3 hours PTA.  He had a tetanus shot recent Antibiotic ointment applied. Disposition and Plan     Clinical Impression:  Puncture wound  (primary encounter diagnosis)    Disposition:  Discharge  9/9/2020  7:52 pm    Follow-up:  No follow-up provider specified.         Medications Prescr 1.62

## 2020-09-13 ENCOUNTER — APPOINTMENT (OUTPATIENT)
Dept: DISASTER EMERGENCY | Facility: CLINIC | Age: 70
End: 2020-09-13

## 2020-09-14 ENCOUNTER — FORM ENCOUNTER (OUTPATIENT)
Age: 70
End: 2020-09-14

## 2020-09-14 LAB — SARS-COV-2 N GENE NPH QL NAA+PROBE: NOT DETECTED

## 2020-09-14 NOTE — PHYSICAL EXAM
[Normal Rate and Rhythm] : normal rate and rhythm [2+] : left 2+ [1+] : left 1+ [0] : left 0 [Ankle Swelling (On Exam)] : not present [Ankle Swelling On The Left] : moderate [Varicose Veins Of Lower Extremities] : not present [] : not present [Skin Ulcer] : ulcer [Alert] : alert [Oriented to Person] : oriented to person [Oriented to Place] : oriented to place [Oriented to Time] : oriented to time [Calm] : calm [de-identified] : NAD [de-identified] : no facial edema [de-identified] : supple [de-identified] : unlabored breathing [FreeTextEntry1] : < 2 sec cap refill; toes warm\par 1.5x1.5cm  right great toe ulcer with clean base involving skin only\par No purulent drainage\par no erythema; no cyanosis [de-identified] : soft, NT, ND [de-identified] : FROM of all 4 extremities

## 2020-09-14 NOTE — HISTORY OF PRESENT ILLNESS
[FreeTextEntry1] : 70 yo F with history of HTN, hypercholesterolemia, and RA presenting for evaluation of nonhealing R great toe ulcer adjacent to the nail bed. Ulcer has been present for over 5 months and has been treated with regular wound care by podiatry. She has also undergone noninvasive testing and diagnostic angiogram of RLE by Dr. Griggs, without intervention, per patient. She denies rest pain, but c/o significant pain at the site of the ulcer. She experiences intermittent claudication of bilateral LE. She denies smoking history and has not been diagnosed with diabetes. Denies history of SLE or vasculitis. She is currently taking methotrexate and prednisone for RA.\par \par Pt is s/p RLE angiogram and angioplasty and stent of TP trunk on 12/5/19. However, inline flow was not established to the foot, therefore, patient underwent a R pop (AK) to PT artery bypass with RSVG on 1/3/20. Her rest pain has resolved and her great toe ulcer is nearly healed. Bloody drainage has been noted from her medial thigh wound, just proximal to the origin of the bypass. She denies fever, chills. Her RLE has been edematous and managed with ACE wrap and leg elevation. There is some superficial blistering noted near the medial ankle incision. \par \par  [de-identified] : Pt's right great toe wound healed. However, 3 weeks ago, the wound reopened and she began to have pain. Dr. Das from podiatry debrided the nail and surrounding skin. \par Denies fever, chills.

## 2020-09-14 NOTE — ASSESSMENT
[FreeTextEntry1] : 68 yo F with history of HTN, hypercholesterolemia, and RA presenting for evaluation of nonhealing R great toe ulcer adjacent to the nail bed for the past 5 months s/p RLE angiogram, angiopplasty and stent of TP trunk (12/5/19), and s/p R pop (AK) to PT artery bypass with RSVG on 1/3/20.\par Now with recurrent right great toe ulcer. \par \par Arterial duplex RLE demonstrates occluded pop-PT bypass with monophasic flow distal PT artery [Arterial/Venous Disease] : arterial/venous disease [Medication Management] : medication management [Foot care/Footwear] : foot care/footwear

## 2020-09-14 NOTE — REVIEW OF SYSTEMS
[Leg Claudication] : no intermittent leg claudication [Lower Ext Edema] : no lower extremity edema [Limb Pain] : limb pain [Limb Swelling] : no limb swelling [Skin Wound] : skin wound [Negative] : Heme/Lymph

## 2020-09-15 VITALS — HEIGHT: 62 IN | OXYGEN SATURATION: 99 % | WEIGHT: 162.92 LBS

## 2020-09-15 NOTE — H&P PST ADULT - HISTORY OF PRESENT ILLNESS
Narrative:     69 yo female with PMHX of RA, PAD, HTN, s/p pop-tib bypass with stent of TP trunk (continues on aspirin and plavix) on 1/3/20 with Dr. Walter c/b poor wound healing requiring wound vac to help with healing process. Patient has been seen by outpatient physical therapy for symptom treatment and improvement of functional mobility. Patient follows with Dr. Walter outpatient. Recent duplex scan 9/8/20 showed popliteal to distal posterior tibial artery vein bypass graft occlusion; low flow velocities. Patient now presents for Right Lower Extremity Angiogram possible Angioplasty / Stent with Dr. Walter.       ASA  Mallampati  GFR  Creat  Bleeding Risk  COVID-19  negative 9/14/20       Symptoms:        Angina (Class):   No        Ischemic Symptoms:   No     Heart Failure:        Systolic/Diastolic/Combined:   No        NYHA Class (within 2 weeks):     Assessment of LVEF (Must be within 6 months):       EF:        Assessed by:        Date:     Prior Cardiac Interventions (LHC, stents, CABG):       PCI's (Date, Stents, Vessels):   No        CABG (Date, Grafts):   No     Noninvasive Testing:   Stress Test: Date:        Protocol:        Duration of Exercise:        Symptoms:        EKG Changes:        DTS:        Myocardial Imaging:        Risk Assessment (Low, Medium, High):       Antianginal Therapies:        Beta Blockers:         Calcium Channel Blockers:        Long Acting Nitrates:        Ranexa:     Associated Risk Factors:        Cerebrovascular Disease: N/A       Chronic Lung Disease: N/A       Peripheral Arterial Disease:   YES        Chronic Kidney Disease (if yes, what is GFR): N/A       Uncontrolled Diabetes (if yes, what is HgbA1C or FBS): N/A       Poorly Controlled Hypertension (if yes, what is SBP): N/A       Morbid Obesity (if yes, what is BMI): N/A       History of Recent Ventricular Arrhythmia: N/A       Inability to Ambulate Safely: N/A       Need for Therapeutic Anticoagulation: N/A       Antiplatelet or Contrast Allergy: N/A Narrative:     69 yo female with PMHX of RA, PAD, HTN, s/p pop-tib bypass with stent of TP trunk (continues on aspirin and plavix) on 1/3/20 with Dr. Walter c/b poor wound healing requiring wound vac to help with healing process. Patient has been seen by outpatient physical therapy for symptom treatment and improvement of functional mobility. Patient follows with Dr. Walter outpatient. Recent duplex scan 9/8/20 showed popliteal to distal posterior tibial artery vein bypass graft occlusion; low flow velocities. Patient now presents for Right Lower Extremity Angiogram possible Angioplasty / Stent with Dr. Walter.       ASA-2  Mallampati-2  GFR  Creat  Bleeding Risk  COVID-19  negative 9/14/20       Symptoms:        Angina (Class):   No        Ischemic Symptoms:   No     Heart Failure:        Systolic/Diastolic/Combined:   No        NYHA Class (within 2 weeks):     Assessment of LVEF (Must be within 6 months):       EF:        Assessed by:        Date:     Prior Cardiac Interventions (LHC, stents, CABG):       PCI's (Date, Stents, Vessels):   No        CABG (Date, Grafts):   No     Noninvasive Testing:   Stress Test: Date:        Protocol:        Duration of Exercise:        Symptoms:        EKG Changes:        DTS:        Myocardial Imaging:        Risk Assessment (Low, Medium, High):       Antianginal Therapies:        Beta Blockers:         Calcium Channel Blockers:        Long Acting Nitrates:        Ranexa:     Associated Risk Factors:        Cerebrovascular Disease: N/A       Chronic Lung Disease: N/A       Peripheral Arterial Disease:   YES        Chronic Kidney Disease (if yes, what is GFR): N/A       Uncontrolled Diabetes (if yes, what is HgbA1C or FBS): N/A       Poorly Controlled Hypertension (if yes, what is SBP): N/A       Morbid Obesity (if yes, what is BMI): N/A       History of Recent Ventricular Arrhythmia: N/A       Inability to Ambulate Safely: N/A       Need for Therapeutic Anticoagulation: N/A       Antiplatelet or Contrast Allergy: N/A Narrative:     69 yo female with PMHX of RA, PAD, HTN, s/p pop-tib bypass with stent of TP trunk (continues on aspirin and plavix) on 1/3/20 with Dr. Walter c/b poor wound healing requiring wound vac to help with healing process. Patient has been seen by outpatient physical therapy for symptom treatment and improvement of functional mobility. Patient follows with Dr. Walter outpatient. Recent duplex scan 9/8/20 showed popliteal to distal posterior tibial artery vein bypass graft occlusion; low flow velocities. Patient now presents for Right Lower Extremity Angiogram possible Angioplasty / Stent with Dr. Walter.       ASA-2  Mallampati-2  GFR-91  Creat-0.64  Bleeding Risk-1.9%  COVID-19  negative 9/14/20       Symptoms:        Angina (Class):   No        Ischemic Symptoms:   No     Heart Failure:        Systolic/Diastolic/Combined:   No        NYHA Class (within 2 weeks):     Assessment of LVEF (Must be within 6 months):       EF:        Assessed by:        Date:     Prior Cardiac Interventions (LHC, stents, CABG):       PCI's (Date, Stents, Vessels):   No        CABG (Date, Grafts):   No     Noninvasive Testing:   Stress Test: Date:        Protocol:        Duration of Exercise:        Symptoms:        EKG Changes:        DTS:        Myocardial Imaging:        Risk Assessment (Low, Medium, High):       Antianginal Therapies:        Beta Blockers:         Calcium Channel Blockers:        Long Acting Nitrates:        Ranexa:     Associated Risk Factors:        Cerebrovascular Disease: N/A       Chronic Lung Disease: N/A       Peripheral Arterial Disease:   YES        Chronic Kidney Disease (if yes, what is GFR): N/A       Uncontrolled Diabetes (if yes, what is HgbA1C or FBS): N/A       Poorly Controlled Hypertension (if yes, what is SBP): N/A       Morbid Obesity (if yes, what is BMI): N/A       History of Recent Ventricular Arrhythmia: N/A       Inability to Ambulate Safely: N/A       Need for Therapeutic Anticoagulation: N/A       Antiplatelet or Contrast Allergy: N/A

## 2020-09-15 NOTE — H&P PST ADULT - ASSESSMENT
71 yo female with PMHX of RA, PAD, HTN, s/p pop-tib bypass with stent (continues on aspirin and plavix) of TP trunk on 1/3/20 with Dr. Walter c/b poor wound healing requiring wound vac to help with healing process. Patient has been seen by outpatient physical therapy for symptom treatment and improvement of functional mobility. Patient follows with Dr. Walter outpatient. Recent duplex scan 9/8/20 showed popliteal to distal posterior tibial artery vein bypass graft occlusion; low flow velocities. Patient now presents for Right Lower Extremity Angiogram possible Angioplasty / Stent with Dr. Walter.        - pt seen and examined   - plan for RLE Angiogram possible stent   - continue ASA and Plavix per sx reccs   - f/u reccs post procedure 69 yo female with PMHX of RA, PAD, HTN, s/p pop-tib bypass with stent (continues on aspirin and plavix) of TP trunk on 1/3/20 with Dr. Walter c/b poor wound healing requiring wound vac to help with healing process. Patient has been seen by outpatient physical therapy for symptom treatment and improvement of functional mobility. Patient follows with Dr. Walter outpatient. Recent duplex scan 9/8/20 showed popliteal to distal posterior tibial artery vein bypass graft occlusion; low flow velocities. Patient now presents for Right Lower Extremity Angiogram possible Angioplasty / Stent with Dr. Walter.        - pt seen and examined   - plan for RLE Angiogram possible stent   - continue ASA and Plavix per sx reccs   - f/u reccs post procedure     ASA-2  Mallampati-2  GFR  Creat  Bleeding Risk  COVID-19  negative 9/14/20 71 yo female with PMHX of RA, PAD, HTN, s/p pop-tib bypass with stent (continues on aspirin and plavix) of TP trunk on 1/3/20 with Dr. Walter c/b poor wound healing requiring wound vac to help with healing process. Patient has been seen by outpatient physical therapy for symptom treatment and improvement of functional mobility. Patient follows with Dr. Walter outpatient. Recent duplex scan 9/8/20 showed popliteal to distal posterior tibial artery vein bypass graft occlusion; low flow velocities. Patient now presents for Right Lower Extremity Angiogram possible Angioplasty / Stent with Dr. Walter.        - pt seen and examined / pt took her losartan and amlodopine this am  -NPO after midnight confirmed  - plan for RLE Angiogram possible stent   - continue ASA and Plavix per sx reccs   - f/u reccs post procedure     ASA-2  Mallampati-2  eGFR  Creat  Bleeding Risk  COVID-19  negative 9/14/20 69 yo female with PMHX of RA, PAD, HTN, s/p pop-tib bypass with stent (continues on aspirin and plavix) of TP trunk on 1/3/20 with Dr. Walter c/b poor wound healing requiring wound vac to help with healing process. Patient has been seen by outpatient physical therapy for symptom treatment and improvement of functional mobility. Patient follows with Dr. Walter outpatient. Recent duplex scan 9/8/20 showed popliteal to distal posterior tibial artery vein bypass graft occlusion; low flow velocities. Patient now presents for Right Lower Extremity Angiogram possible Angioplasty / Stent with Dr. Walter.        - pt seen and examined / pt took her losartan and amlodopine this am  -NPO after midnight confirmed  - plan for RLE Angiogram possible stent   - continue ASA and Plavix per sx reccs   - f/u reccs post procedure      ASA-2  Mallampati-2  GFR-91  Creat-0.64  Bleeding Risk-1.9%  COVID-19  negative 9/14/20

## 2020-09-16 ENCOUNTER — APPOINTMENT (OUTPATIENT)
Dept: VASCULAR SURGERY | Facility: HOSPITAL | Age: 70
End: 2020-09-16

## 2020-09-16 ENCOUNTER — TRANSCRIPTION ENCOUNTER (OUTPATIENT)
Age: 70
End: 2020-09-16

## 2020-09-16 ENCOUNTER — INPATIENT (INPATIENT)
Facility: HOSPITAL | Age: 70
LOS: 0 days | Discharge: HOME | DRG: 254 | End: 2020-09-16
Attending: SURGERY | Admitting: SURGERY
Payer: COMMERCIAL

## 2020-09-16 VITALS
DIASTOLIC BLOOD PRESSURE: 72 MMHG | RESPIRATION RATE: 18 BRPM | OXYGEN SATURATION: 98 % | HEART RATE: 85 BPM | SYSTOLIC BLOOD PRESSURE: 128 MMHG

## 2020-09-16 DIAGNOSIS — Z96.651 PRESENCE OF RIGHT ARTIFICIAL KNEE JOINT: Chronic | ICD-10-CM

## 2020-09-16 DIAGNOSIS — Z98.891 HISTORY OF UTERINE SCAR FROM PREVIOUS SURGERY: Chronic | ICD-10-CM

## 2020-09-16 DIAGNOSIS — I73.9 PERIPHERAL VASCULAR DISEASE, UNSPECIFIED: ICD-10-CM

## 2020-09-16 DIAGNOSIS — Z98.89 OTHER SPECIFIED POSTPROCEDURAL STATES: Chronic | ICD-10-CM

## 2020-09-16 LAB
ANION GAP SERPL CALC-SCNC: 12 MMOL/L — SIGNIFICANT CHANGE UP (ref 5–17)
APTT BLD: 26.3 SEC — LOW (ref 27.5–35.5)
BASOPHILS # BLD AUTO: 0.09 K/UL — SIGNIFICANT CHANGE UP (ref 0–0.2)
BASOPHILS NFR BLD AUTO: 1.4 % — SIGNIFICANT CHANGE UP (ref 0–2)
BLD GP AB SCN SERPL QL: SIGNIFICANT CHANGE UP
BUN SERPL-MCNC: 17 MG/DL — SIGNIFICANT CHANGE UP (ref 8–20)
CALCIUM SERPL-MCNC: 9.5 MG/DL — SIGNIFICANT CHANGE UP (ref 8.6–10.2)
CHLORIDE SERPL-SCNC: 101 MMOL/L — SIGNIFICANT CHANGE UP (ref 98–107)
CO2 SERPL-SCNC: 25 MMOL/L — SIGNIFICANT CHANGE UP (ref 22–29)
CREAT SERPL-MCNC: 0.64 MG/DL — SIGNIFICANT CHANGE UP (ref 0.5–1.3)
EOSINOPHIL # BLD AUTO: 0.14 K/UL — SIGNIFICANT CHANGE UP (ref 0–0.5)
EOSINOPHIL NFR BLD AUTO: 2.2 % — SIGNIFICANT CHANGE UP (ref 0–6)
GLUCOSE SERPL-MCNC: 99 MG/DL — SIGNIFICANT CHANGE UP (ref 70–99)
HCT VFR BLD CALC: 44.5 % — SIGNIFICANT CHANGE UP (ref 34.5–45)
HGB BLD-MCNC: 15 G/DL — SIGNIFICANT CHANGE UP (ref 11.5–15.5)
IMM GRANULOCYTES NFR BLD AUTO: 0.2 % — SIGNIFICANT CHANGE UP (ref 0–1.5)
INR BLD: 1.17 RATIO — HIGH (ref 0.88–1.16)
LYMPHOCYTES # BLD AUTO: 1.99 K/UL — SIGNIFICANT CHANGE UP (ref 1–3.3)
LYMPHOCYTES # BLD AUTO: 31.2 % — SIGNIFICANT CHANGE UP (ref 13–44)
MCHC RBC-ENTMCNC: 30.5 PG — SIGNIFICANT CHANGE UP (ref 27–34)
MCHC RBC-ENTMCNC: 33.7 GM/DL — SIGNIFICANT CHANGE UP (ref 32–36)
MCV RBC AUTO: 90.6 FL — SIGNIFICANT CHANGE UP (ref 80–100)
MONOCYTES # BLD AUTO: 0.72 K/UL — SIGNIFICANT CHANGE UP (ref 0–0.9)
MONOCYTES NFR BLD AUTO: 11.3 % — SIGNIFICANT CHANGE UP (ref 2–14)
NEUTROPHILS # BLD AUTO: 3.42 K/UL — SIGNIFICANT CHANGE UP (ref 1.8–7.4)
NEUTROPHILS NFR BLD AUTO: 53.7 % — SIGNIFICANT CHANGE UP (ref 43–77)
PLATELET # BLD AUTO: 402 K/UL — HIGH (ref 150–400)
POTASSIUM SERPL-MCNC: 4.1 MMOL/L — SIGNIFICANT CHANGE UP (ref 3.5–5.3)
POTASSIUM SERPL-SCNC: 4.1 MMOL/L — SIGNIFICANT CHANGE UP (ref 3.5–5.3)
PROTHROM AB SERPL-ACNC: 13.5 SEC — SIGNIFICANT CHANGE UP (ref 10.6–13.6)
RBC # BLD: 4.91 M/UL — SIGNIFICANT CHANGE UP (ref 3.8–5.2)
RBC # FLD: 14.1 % — SIGNIFICANT CHANGE UP (ref 10.3–14.5)
SODIUM SERPL-SCNC: 138 MMOL/L — SIGNIFICANT CHANGE UP (ref 135–145)
WBC # BLD: 6.37 K/UL — SIGNIFICANT CHANGE UP (ref 3.8–10.5)
WBC # FLD AUTO: 6.37 K/UL — SIGNIFICANT CHANGE UP (ref 3.8–10.5)

## 2020-09-16 PROCEDURE — 86901 BLOOD TYPING SEROLOGIC RH(D): CPT

## 2020-09-16 PROCEDURE — 93005 ELECTROCARDIOGRAM TRACING: CPT

## 2020-09-16 PROCEDURE — 86850 RBC ANTIBODY SCREEN: CPT

## 2020-09-16 PROCEDURE — 86900 BLOOD TYPING SEROLOGIC ABO: CPT

## 2020-09-16 PROCEDURE — 37228: CPT

## 2020-09-16 PROCEDURE — 80048 BASIC METABOLIC PNL TOTAL CA: CPT

## 2020-09-16 PROCEDURE — C1894: CPT

## 2020-09-16 PROCEDURE — C1725: CPT

## 2020-09-16 PROCEDURE — 75716 ARTERY X-RAYS ARMS/LEGS: CPT | Mod: XU

## 2020-09-16 PROCEDURE — 85730 THROMBOPLASTIN TIME PARTIAL: CPT

## 2020-09-16 PROCEDURE — 36415 COLL VENOUS BLD VENIPUNCTURE: CPT

## 2020-09-16 PROCEDURE — 99152 MOD SED SAME PHYS/QHP 5/>YRS: CPT

## 2020-09-16 PROCEDURE — 93010 ELECTROCARDIOGRAM REPORT: CPT

## 2020-09-16 PROCEDURE — C1887: CPT

## 2020-09-16 PROCEDURE — C1760: CPT

## 2020-09-16 PROCEDURE — 85025 COMPLETE CBC W/AUTO DIFF WBC: CPT

## 2020-09-16 PROCEDURE — C1769: CPT

## 2020-09-16 PROCEDURE — 85610 PROTHROMBIN TIME: CPT

## 2020-09-16 PROCEDURE — 99153 MOD SED SAME PHYS/QHP EA: CPT

## 2020-09-16 RX ORDER — RIVAROXABAN 15 MG-20MG
1 KIT ORAL
Qty: 30 | Refills: 0
Start: 2020-09-16 | End: 2020-10-15

## 2020-09-16 RX ORDER — LIDOCAINE HYDROCHLORIDE AND EPINEPHRINE 10; 10 MG/ML; UG/ML
2 INJECTION, SOLUTION INFILTRATION; PERINEURAL ONCE
Refills: 0 | Status: COMPLETED | OUTPATIENT
Start: 2020-09-16 | End: 2020-09-16

## 2020-09-16 RX ORDER — RIVAROXABAN 15 MG-20MG
20 KIT ORAL DAILY
Refills: 0 | Status: DISCONTINUED | OUTPATIENT
Start: 2020-09-17 | End: 2020-09-16

## 2020-09-16 RX ORDER — CHOLECALCIFEROL (VITAMIN D3) 125 MCG
1 CAPSULE ORAL
Qty: 0 | Refills: 0 | DISCHARGE

## 2020-09-16 RX ORDER — RIVAROXABAN 15 MG-20MG
1 KIT ORAL
Qty: 0 | Refills: 0 | DISCHARGE
Start: 2020-09-16

## 2020-09-16 RX ORDER — AMLODIPINE BESYLATE 2.5 MG/1
5 TABLET ORAL DAILY
Refills: 0 | Status: DISCONTINUED | OUTPATIENT
Start: 2020-09-16 | End: 2020-09-16

## 2020-09-16 RX ORDER — ASPIRIN/CALCIUM CARB/MAGNESIUM 324 MG
1 TABLET ORAL
Qty: 0 | Refills: 0 | DISCHARGE

## 2020-09-16 RX ORDER — OMEGA-3 ACID ETHYL ESTERS 1 G
1 CAPSULE ORAL
Qty: 0 | Refills: 0 | DISCHARGE

## 2020-09-16 RX ORDER — ASPIRIN/CALCIUM CARB/MAGNESIUM 324 MG
81 TABLET ORAL DAILY
Refills: 0 | Status: DISCONTINUED | OUTPATIENT
Start: 2020-09-16 | End: 2020-09-16

## 2020-09-16 RX ORDER — CLOPIDOGREL BISULFATE 75 MG/1
75 TABLET, FILM COATED ORAL ONCE
Refills: 0 | Status: COMPLETED | OUTPATIENT
Start: 2020-09-16 | End: 2020-09-16

## 2020-09-16 RX ORDER — PANTOPRAZOLE SODIUM 20 MG/1
1 TABLET, DELAYED RELEASE ORAL
Qty: 0 | Refills: 0 | DISCHARGE

## 2020-09-16 RX ORDER — ACETAMINOPHEN 500 MG
1000 TABLET ORAL ONCE
Refills: 0 | Status: COMPLETED | OUTPATIENT
Start: 2020-09-16 | End: 2020-09-16

## 2020-09-16 RX ORDER — ALTEPLASE 100 MG
8 KIT INTRAVENOUS ONCE
Refills: 0 | Status: COMPLETED | OUTPATIENT
Start: 2020-09-16 | End: 2020-09-16

## 2020-09-16 RX ORDER — RIVAROXABAN 15 MG-20MG
15 KIT ORAL ONCE
Refills: 0 | Status: COMPLETED | OUTPATIENT
Start: 2020-09-16 | End: 2020-09-16

## 2020-09-16 RX ADMIN — RIVAROXABAN 15 MILLIGRAM(S): KIT at 20:48

## 2020-09-16 RX ADMIN — Medication 81 MILLIGRAM(S): at 12:51

## 2020-09-16 RX ADMIN — Medication 400 MILLIGRAM(S): at 21:41

## 2020-09-16 RX ADMIN — LIDOCAINE HYDROCHLORIDE AND EPINEPHRINE 2 MILLILITER(S): 10; 10 INJECTION, SOLUTION INFILTRATION; PERINEURAL at 18:23

## 2020-09-16 RX ADMIN — CLOPIDOGREL BISULFATE 75 MILLIGRAM(S): 75 TABLET, FILM COATED ORAL at 12:51

## 2020-09-16 RX ADMIN — LIDOCAINE HYDROCHLORIDE AND EPINEPHRINE 2 MILLILITER(S): 10; 10 INJECTION, SOLUTION INFILTRATION; PERINEURAL at 18:37

## 2020-09-16 NOTE — DISCHARGE NOTE NURSING/CASE MANAGEMENT/SOCIAL WORK - NSFLUVACAGEDISCH_IMM_ALL_CORE
Hpi Title: Evaluation of Skin Lesions How Severe Are Your Spot(S)?: mild Have Your Spot(S) Been Treated In The Past?: has not been treated Adult

## 2020-09-16 NOTE — DISCHARGE NOTE PROVIDER - NSDCFUADDINST_GEN_ALL_CORE_FT
Start xarelto 20 mg po Daily tomorrow  Discontinue Baby ASA  Continue Plavix 75 mg po Daily and all other preprocedure medications

## 2020-09-16 NOTE — DISCHARGE NOTE PROVIDER - NSDCCPTREATMENT_GEN_ALL_CORE_FT
PRINCIPAL PROCEDURE  Procedure: Arteriogram extremity lower right  Findings and Treatment: balloon angioplasty of popliteal to PT bypass via LFA

## 2020-09-16 NOTE — PROGRESS NOTE ADULT - SUBJECTIVE AND OBJECTIVE BOX
Cardiology Nurse Practitioner Progress note:   s/p RLE angiogram with balloon angioplasty of poplieal/PT bypass via # 6Fr LFA (now with Mynx Closure device)  Patient feels well.  Denies chest pain, shortness of breath, dizziness or palpitations at this time.        TELE:no ectopy, NSR   GENERAL: appears comfortable, denies any c/o  CV: RRR, S1 S2, no murmur, rub, clicks or gallop noted  RESP: LCTA bilaterally, no wheeze, no rhonchi or crackles noted  GI/: soft, NT/ND  NEURO: AOx4, no focal deficits  EXTREMITIES: no edema, clubbing or cyanosis noted  PROCEDURE SITE:  Left  groin dressing intact, site CDI with no bleeding, no hematoma, area soft, non tender, palpable PT pulse on Right/ Dr Aguilar/ Hernan @ bedside and aware ( now with mynx closure device)        T(C): 36.7 (09-16-20 @ 12:23), Max: 36.7 (09-16-20 @ 12:23)  HR: 86 (09-16-20 @ 12:23) (86 - 86)  BP: 126/70 (09-16-20 @ 12:23) (126/70 - 126/70)  RR: 18 (09-16-20 @ 12:23) (18 - 18)  SpO2: 98% (09-16-20 @ 12:23) (98% - 99%)  Wt(kg): --  CBC Full  -  ( 16 Sep 2020 12:34 )  WBC Count : 6.37 K/uL  RBC Count : 4.91 M/uL  Hemoglobin : 15.0 g/dL  Hematocrit : 44.5 %  Platelet Count - Automated : 402 K/uL  Mean Cell Volume : 90.6 fl  Mean Cell Hemoglobin : 30.5 pg  Mean Cell Hemoglobin Concentration : 33.7 gm/dL  Auto Neutrophil # : 3.42 K/uL  Auto Lymphocyte # : 1.99 K/uL  Auto Monocyte # : 0.72 K/uL  Auto Eosinophil # : 0.14 K/uL  Auto Basophil # : 0.09 K/uL  Auto Neutrophil % : 53.7 %  Auto Lymphocyte % : 31.2 %  Auto Monocyte % : 11.3 %  Auto Eosinophil % : 2.2 %  Auto Basophil % : 1.4 %    09-16    138  |  101  |  17.0  ----------------------------<  99  4.1   |  25.0  |  0.64    Ca    9.5      16 Sep 2020 12:34              MEDICATIONS  (STANDING):  alteplase    CDT Bolus. 8 milliGRAM(s) IntraCatheter. once  amLODIPine   Tablet 5 milliGRAM(s) Oral daily  rivaroxaban 15 milliGRAM(s) Oral once    MEDICATIONS  (PRN):        HPI:  Narrative:     69 yo female with PMHX of RA, PAD, HTN, s/p pop-tib bypass with stent of TP trunk (continues on aspirin and plavix) on 1/3/20 with Dr. Walter c/b poor wound healing requiring wound vac to help with healing process. Patient has been seen by outpatient physical therapy for symptom treatment and improvement of functional mobility. Patient follows with Dr. Walter outpatient. Recent duplex scan 9/8/20 showed popliteal to distal posterior tibial artery vein bypass graft occlusion; low flow velocities. Patient now presents for Right Lower Extremity Angiogram possible Angioplasty / Stent with Dr. Walter.       ASA-2  Mallampati-2  GFR-91  Creat-0.64  Bleeding Risk-1.9%  COVID-19  negative 9/14/20       Symptoms:        Angina (Class):   No        Ischemic Symptoms:   No     Heart Failure:        Systolic/Diastolic/Combined:   No        NYHA Class (within 2 weeks):     Assessment of LVEF (Must be within 6 months):       EF:        Assessed by:        Date:     Prior Cardiac Interventions (LHC, stents, CABG):       PCI's (Date, Stents, Vessels):   No        CABG (Date, Grafts):   No     Noninvasive Testing:   Stress Test: Date:        Protocol:        Duration of Exercise:        Symptoms:        EKG Changes:        DTS:        Myocardial Imaging:        Risk Assessment (Low, Medium, High):       Antianginal Therapies:        Beta Blockers:         Calcium Channel Blockers:        Long Acting Nitrates:        Ranexa:     Associated Risk Factors:        Cerebrovascular Disease: N/A       Chronic Lung Disease: N/A       Peripheral Arterial Disease:   YES        Chronic Kidney Disease (if yes, what is GFR): N/A       Uncontrolled Diabetes (if yes, what is HgbA1C or FBS): N/A       Poorly Controlled Hypertension (if yes, what is SBP): N/A       Morbid Obesity (if yes, what is BMI): N/A       History of Recent Ventricular Arrhythmia: N/A       Inability to Ambulate Safely: N/A       Need for Therapeutic Anticoagulation: N/A       Antiplatelet or Contrast Allergy: N/A (15 Sep 2020 16:40)    s/p RLE angiogram with balloon angioplasty of popliteal/ PT bypass via #6Fr LFA (now w/Mynx Closure device) ( full report to follow)    ASSESSMENT/PLAN:    -LFA groin precautions/ protocol  -HOB @ 30 degrees in 1 hour post procedure  -Bedrest x 4 hours until 20:30   -Xarelto 15 mg po x 1 dose @ 8PM this evening prior to discharge  -continue xarelto 20 mg po Daily starting tomorrow 9/17  -continue plavix, Discontinue Baby ASA  -Plan of care discussed with patient,   -labs, EKG and procedure site assessed  -discharge home @ 8:45 PM tonight pending LFA without bleeding or hematoma/ VSS  -Follow-up with Dr Walter in 1-2 weeks

## 2020-09-16 NOTE — CHART NOTE - NSCHARTNOTEFT_GEN_A_CORE
Pt's LFA access site drsg changed x2 -continues to ooze-DSD reapplied-Vascular Service called re: questionable discharge this evening seccondary to continuous oozing from LFA surface. Awaiting covering provider to reassess.

## 2020-09-16 NOTE — DISCHARGE NOTE NURSING/CASE MANAGEMENT/SOCIAL WORK - PATIENT PORTAL LINK FT
You can access the FollowMyHealth Patient Portal offered by [facility] by registering at the following website: [email or web address]. By joining [facility]’s FollowMyHealth portal, you will also be able to view your health information using other applications (apps) compatible with our system.

## 2020-09-16 NOTE — DISCHARGE NOTE PROVIDER - NSDCMRMEDTOKEN_GEN_ALL_CORE_FT
acetaminophen 325 mg oral tablet: 2 tab(s) orally every 6 hours, As Needed  amLODIPine 5 mg oral tablet: 1 tab(s) orally once a day  biotin 5000 mcg oral tablet, disintegratin tab(s) orally once a day  clopidogrel 75 mg oral tablet: 1 tab(s) orally once a day  folic acid 1 mg oral tablet: 1 tab(s) orally once a day  gabapentin 300 mg oral capsule: 1 cap(s) orally 3 times a day  glucosamine hydrochloride 1500 mg oral tablet: 1 tab(s) orally once a day  hydroCHLOROthiazide 12.5 mg oral tablet: 1 tab(s) orally once a day  losartan-hydrochlorothiazide 50mg-12.5mg oral tablet: 1 tab(s) orally once a day  methotrexate 2.5 mg oral tablet: 6 tab(s) orally every 7 days  Multiple Vitamins oral tablet: 1 tab(s) orally once a day  oxybutynin 5 mg/24 hours oral tablet, extended release: 1 tab(s) orally once a day  Percocet 5/325 oral tablet: 1 tab(s) orally every 6 hours  Protonix 40 mg oral delayed release tablet: 1 tab(s) orally once a day   rivaroxaban 20 mg oral tablet: 1 tab(s) orally once a day   rivaroxaban 20 mg oral tablet: 1 tab(s) orally once a day  simvastatin 40 mg oral tablet: 1 tab(s) orally once a day (at bedtime)  Vitamin B12 1000 mcg oral tablet: 1 tab(s) orally once a day  Zinc 140 mg (as elemental zinc 50 mg) oral tablet: 1 tab(s) orally once a day   acetaminophen 325 mg oral tablet: 2 tab(s) orally every 6 hours, As Needed  amLODIPine 5 mg oral tablet: 1 tab(s) orally once a day  biotin 5000 mcg oral tablet, disintegratin tab(s) orally once a day  clopidogrel 75 mg oral tablet: 1 tab(s) orally once a day  folic acid 1 mg oral tablet: 1 tab(s) orally once a day  gabapentin 300 mg oral capsule: 1 cap(s) orally 3 times a day  glucosamine hydrochloride 1500 mg oral tablet: 1 tab(s) orally once a day  hydroCHLOROthiazide 12.5 mg oral tablet: 1 tab(s) orally once a day  losartan-hydrochlorothiazide 50mg-12.5mg oral tablet: 1 tab(s) orally once a day  methotrexate 2.5 mg oral tablet: 6 tab(s) orally every 7 days  Multiple Vitamins oral tablet: 1 tab(s) orally once a day  oxybutynin 5 mg/24 hours oral tablet, extended release: 1 tab(s) orally once a day  Percocet 5/325 oral tablet: 1 tab(s) orally every 6 hours  Protonix 40 mg oral delayed release tablet: 1 tab(s) orally once a day   rivaroxaban 20 mg oral tablet: 1 tab(s) orally once a day   simvastatin 40 mg oral tablet: 1 tab(s) orally once a day (at bedtime)  Vitamin B12 1000 mcg oral tablet: 1 tab(s) orally once a day  Zinc 140 mg (as elemental zinc 50 mg) oral tablet: 1 tab(s) orally once a day   acetaminophen 325 mg oral tablet: 2 tab(s) orally every 6 hours, As Needed  amLODIPine 5 mg oral tablet: 1 tab(s) orally once a day  biotin 5000 mcg oral tablet, disintegratin tab(s) orally once a day  clopidogrel 75 mg oral tablet: 1 tab(s) orally once a day  folic acid 1 mg oral tablet: 1 tab(s) orally once a day  gabapentin 300 mg oral capsule: 1 cap(s) orally 3 times a day  glucosamine hydrochloride 1500 mg oral tablet: 1 tab(s) orally once a day  hydroCHLOROthiazide 12.5 mg oral tablet: 1 tab(s) orally once a day  losartan-hydrochlorothiazide 50mg-12.5mg oral tablet: 1 tab(s) orally once a day  methotrexate 2.5 mg oral tablet: 6 tab(s) orally every 7 days  Multiple Vitamins oral tablet: 1 tab(s) orally once a day  oxybutynin 5 mg/24 hours oral tablet, extended release: 1 tab(s) orally once a day  Percocet 5/325 oral tablet: 1 tab(s) orally every 6 hours  Protonix 40 mg oral delayed release tablet: 1 tab(s) orally once a day   rivaroxaban 20 mg oral tablet: 1 tab(s) orally once a day  simvastatin 40 mg oral tablet: 1 tab(s) orally once a day (at bedtime)  Vitamin B12 1000 mcg oral tablet: 1 tab(s) orally once a day  Zinc 140 mg (as elemental zinc 50 mg) oral tablet: 1 tab(s) orally once a day

## 2020-09-16 NOTE — BRIEF OPERATIVE NOTE - OPERATION/FINDINGS
Patient w/ occlusion of right lower extremity pop to PT reversed GSV bypass. Able to ballooon angioplasty and restore patency to bypass. Administered 8mg of catheter directed tpa to bypass. Pt w/ palpable pulses at conclusion of case. Left groin access site closed with mynx device and pressure held.

## 2020-09-16 NOTE — CHART NOTE - NSCHARTNOTEFT_GEN_A_CORE
Pt with small amount of superficial oozing from LFA mynx closure site-2 ml of lido with epi injected subcutaneously, d-stat drsg applied-will continue to assess Pt with small amount of superficial oozing from LFA mynx closure site-2 ml of lido with epi injected subcutaneously, d-stat drsg applied-will continue to assess    Dr Walter and vascular residents in at bedside- lido with epi 2 ml infiltrated around oozing LFA closure area-3.0 prolene stitch inserted with sterile technique / DSD reapplied-will continue to assess.

## 2020-09-16 NOTE — DISCHARGE NOTE PROVIDER - NSDCCPCAREPLAN_GEN_ALL_CORE_FT
PRINCIPAL DISCHARGE DIAGNOSIS  Diagnosis: Peripheral vascular disease  Assessment and Plan of Treatment:

## 2020-09-16 NOTE — DISCHARGE NOTE PROVIDER - HOSPITAL COURSE
`Patient admitted post   tolerated procedure well    ekg, tele and labs reviewed, VSS  patient seen and assessed and is cleared for discharge home  f/u with Dr Walter in 1-2 weeks  Xarelto 20 mg po Daily starting tomorrow 9/17  Continue plavix/ DISCONTINUE Baby Aspirin         `Patient admitted post   tolerated procedure well    ekg, tele and labs reviewed, VSS  patient seen and assessed and is cleared for discharge home  f/u with Dr Walter in 1 week-for Left groin suture removal  Xarelto 20 mg po Daily starting tomorrow 9/17  Continue plavix/ DISCONTINUE Baby Aspirin

## 2020-09-16 NOTE — DISCHARGE NOTE NURSING/CASE MANAGEMENT/SOCIAL WORK - NSDCPEXARELTO_GEN_ALL_CORE
Rivaroxaban/Xarelto - Follow up monitoring/Rivaroxaban/Xarelto - Compliance/Rivaroxaban/Xarelto - Potential for adverse drug reactions and interactions/Rivaroxaban/Xarelto - Dietary Advice

## 2020-09-16 NOTE — DISCHARGE NOTE PROVIDER - CARE PROVIDER_API CALL
ManvarSingh, Pallavi B (MD)  Vascular Surgery  [address] [place] [address], [address] [place] [address]  Phone: [phone]  Fax: [phone]  Follow Up Time:

## 2020-09-16 NOTE — DISCHARGE NOTE NURSING/CASE MANAGEMENT/SOCIAL WORK - NSDCPEPTSTRK_GEN_ALL_CORE
Stroke support groups for patients, families, and friends/Need for follow up after discharge/Prescribed medications/Risk factors for stroke/Stroke education booklet/Call 911 for stroke/Stroke warning signs and symptoms/Signs and symptoms of stroke

## 2020-09-25 ENCOUNTER — APPOINTMENT (OUTPATIENT)
Dept: VASCULAR SURGERY | Facility: CLINIC | Age: 70
End: 2020-09-25
Payer: MEDICARE

## 2020-09-25 VITALS
TEMPERATURE: 98.7 F | WEIGHT: 162 LBS | SYSTOLIC BLOOD PRESSURE: 110 MMHG | BODY MASS INDEX: 32.66 KG/M2 | HEIGHT: 59 IN | OXYGEN SATURATION: 97 % | HEART RATE: 100 BPM | DIASTOLIC BLOOD PRESSURE: 77 MMHG

## 2020-09-25 PROCEDURE — 99214 OFFICE O/P EST MOD 30 MIN: CPT

## 2020-09-25 NOTE — HISTORY OF PRESENT ILLNESS
[FreeTextEntry1] : 70 yo F with history of HTN, hypercholesterolemia, and RA presenting for evaluation of nonhealing R great toe ulcer adjacent to the nail bed. Ulcer has been present for over 5 months and has been treated with regular wound care by podiatry. She has also undergone noninvasive testing and diagnostic angiogram of RLE by Dr. Griggs, without intervention, per patient. She denies rest pain, but c/o significant pain at the site of the ulcer. She experiences intermittent claudication of bilateral LE. She denies smoking history and has not been diagnosed with diabetes. Denies history of SLE or vasculitis. She is currently taking methotrexate and prednisone for RA.\par \par Pt is s/p RLE angiogram and angioplasty and stent of TP trunk on 12/5/19. However, inline flow was not established to the foot, therefore, patient underwent a R pop (AK) to PT artery bypass with RSVG on 1/3/20. Her rest pain has resolved and her great toe ulcer is nearly healed. Bloody drainage has been noted from her medial thigh wound, just proximal to the origin of the bypass. She denies fever, chills. Her RLE has been edematous and managed with ACE wrap and leg elevation. There is some superficial blistering noted near the medial ankle incision. \par \par  [de-identified] : Pt s/p RLE angiogram and balloon angioplasty and recannulization of R pop-PT bypass RSVG on 9/16/20 due to thrombosed bypass and recurrent R foot rest pain and nonhealing right great toe ulcer. Pt doing well and states foot now feels warm. She continues to have pain in the right great toe. She is on xarelto daily.\par She ambulates without claudication. Denies fever, chills. She is currently on doxycycline for osteomyelitis.

## 2020-09-25 NOTE — PHYSICAL EXAM
[Normal Rate and Rhythm] : normal rate and rhythm [1+] : left 1+ [2+] : right 2+ [0] : left 0 [Ankle Swelling (On Exam)] : present [Ankle Swelling On The Right] : of the right ankle [Ankle Swelling On The Left] : moderate [Varicose Veins Of Lower Extremities] : not present [] : not present [Skin Ulcer] : ulcer [Alert] : alert [Oriented to Person] : oriented to person [Oriented to Place] : oriented to place [Oriented to Time] : oriented to time [Calm] : calm [de-identified] : NAD [de-identified] : no facial edema [de-identified] : supple [de-identified] : unlabored breathing [FreeTextEntry1] : < 2 sec cap refill; toes warm\par 2+ palpable graft pulse\par 1.5x1.5cm  right great toe ulcer with eschar at base of wound\par Some minimal yellow drainage, no foul odor\par no erythema; no cyanosis [de-identified] : soft, NT, ND [de-identified] : FROM of all 4 extremities

## 2020-09-25 NOTE — REVIEW OF SYSTEMS
[Leg Claudication] : no intermittent leg claudication [Lower Ext Edema] : no lower extremity edema [Limb Pain] : no limb pain [Limb Swelling] : no limb swelling [Skin Wound] : skin wound [Negative] : Heme/Lymph

## 2020-09-25 NOTE — ASSESSMENT
[FreeTextEntry1] : 68 yo F with history of HTN, hypercholesterolemia, and RA presenting for evaluation of nonhealing R great toe ulcer adjacent to the nail bed for the past 5 months s/p RLE angiogram, angiopplasty and stent of TP trunk (12/5/19), and s/p R pop (AK) to PT artery bypass with RSVG on 1/3/20.\par Now with recurrent right great toe ulcer s/p RLE angiogram and balloon angioplasty and recannulization of R pop-PT bypass RSVG on 9/16/20. Remains patent.\par \par L groin suture removed in office today [Arterial/Venous Disease] : arterial/venous disease [Medication Management] : medication management [Foot care/Footwear] : foot care/footwear

## 2020-12-11 NOTE — ASSESSMENT
[FreeTextEntry1] : 68 yo F with history of HTN, hypercholesterolemia, and RA presenting for evaluation of nonhealing R great toe ulcer adjacent to the nail bed for the past 5 months s/p RLE angiogram, angiopplasty and stent of TP trunk (12/5/19), and s/p R pop (AK) to PT artery bypass with RSVG on 1/3/20.\par Now with recurrent right great toe ulcer. \par \par Arterial duplex RLE demonstrates occluded pop-PT bypass with monophasic flow distal PT artery, discussed need for revascularization as well as order of events and anticoagulation. Also need for repeat angio to plan potential reintervention vs bypass. The patient and family understand and agree.\par \par \par -Proceed with angio with Dr. Aguilar as scheduled. [Arterial/Venous Disease] : arterial/venous disease [Medication Management] : medication management [Foot care/Footwear] : foot care/footwear

## 2020-12-11 NOTE — PHYSICAL EXAM
[Normal Rate and Rhythm] : normal rate and rhythm [2+] : left 2+ [1+] : left 1+ [0] : left 0 [Ankle Swelling (On Exam)] : not present [Ankle Swelling On The Left] : moderate [Varicose Veins Of Lower Extremities] : not present [] : not present [Skin Ulcer] : ulcer [Alert] : alert [Oriented to Person] : oriented to person [Oriented to Place] : oriented to place [Oriented to Time] : oriented to time [Calm] : calm [de-identified] : NAD [de-identified] : no facial edema [de-identified] : supple [de-identified] : unlabored breathing [FreeTextEntry1] : < 2 sec cap refill; toes warm\par 1.5x1.5cm  right great toe ulcer with clean base involving skin only\par No purulent drainage\par no erythema; no cyanosis [de-identified] : soft, NT, ND [de-identified] : FROM of all 4 extremities

## 2020-12-11 NOTE — HISTORY OF PRESENT ILLNESS
[FreeTextEntry1] : 70 yo F with history of HTN, hypercholesterolemia, and RA presenting for evaluation of nonhealing R great toe ulcer adjacent to the nail bed. Ulcer has been present for over 5 months and has been treated with regular wound care by podiatry. She has also undergone noninvasive testing and diagnostic angiogram of RLE by Dr. Griggs, without intervention, per patient. She denies rest pain, but c/o significant pain at the site of the ulcer. She experiences intermittent claudication of bilateral LE. She denies smoking history and has not been diagnosed with diabetes. Denies history of SLE or vasculitis. She is currently taking methotrexate and prednisone for RA.\par \par Pt is s/p RLE angiogram and angioplasty and stent of TP trunk on 12/5/19. However, inline flow was not established to the foot, therefore, patient underwent a R pop (AK) to PT artery bypass with RSVG on 1/3/20. Her rest pain has resolved and her great toe ulcer is nearly healed. Bloody drainage has been noted from her medial thigh wound, just proximal to the origin of the bypass. She denies fever, chills. Her RLE has been edematous and managed with ACE wrap and leg elevation. There is some superficial blistering noted near the medial ankle incision. \par \par  [de-identified] : Pt's right great toe wound healed. However, 3 weeks ago, the wound reopened and she began to have pain. Dr. Das from podiatry debrided the nail and surrounding skin. \par Denies fever, chills. Here to discuss planned procedure with Dr. Aguilar.

## 2021-03-31 RX ORDER — RIVAROXABAN 20 MG/1
20 TABLET, FILM COATED ORAL
Qty: 30 | Refills: 3 | Status: COMPLETED | COMMUNITY
Start: 2020-09-25 | End: 2021-03-31

## 2021-08-16 ENCOUNTER — APPOINTMENT (OUTPATIENT)
Dept: VASCULAR SURGERY | Facility: CLINIC | Age: 71
End: 2021-08-16
Payer: COMMERCIAL

## 2021-08-16 VITALS
TEMPERATURE: 97.6 F | HEIGHT: 57.5 IN | WEIGHT: 157 LBS | BODY MASS INDEX: 33.41 KG/M2 | HEART RATE: 66 BPM | OXYGEN SATURATION: 97 % | SYSTOLIC BLOOD PRESSURE: 161 MMHG | DIASTOLIC BLOOD PRESSURE: 87 MMHG

## 2021-08-16 VITALS — SYSTOLIC BLOOD PRESSURE: 152 MMHG | DIASTOLIC BLOOD PRESSURE: 84 MMHG

## 2021-08-16 PROCEDURE — 99214 OFFICE O/P EST MOD 30 MIN: CPT

## 2021-08-16 PROCEDURE — 93926 LOWER EXTREMITY STUDY: CPT

## 2021-08-16 RX ORDER — RIVAROXABAN 20 MG/1
20 TABLET, FILM COATED ORAL
Qty: 90 | Refills: 1 | Status: DISCONTINUED | COMMUNITY
Start: 2020-11-23 | End: 2021-08-16

## 2021-08-16 NOTE — ASSESSMENT
[FreeTextEntry1] : 68 yo F with history of HTN, hypercholesterolemia, and RA presenting for evaluation of nonhealing R great toe ulcer adjacent to the nail bed for the past 5 months s/p RLE angiogram, angiopplasty and stent of TP trunk (12/5/19), and s/p R pop (AK) to PT artery bypass with RSVG on 1/3/20.\par Now with recurrent right great toe ulcer s/p RLE angiogram and balloon angioplasty and recannulization of R pop-PT bypass RSVG on 9/16/20. Remains patent.\par \par Arterial duplex demonstrates patent pop-PT bypass with decreased flow throughout, and increased velocity appreciated at proximal anastomosis. PT artery patent [Arterial/Venous Disease] : arterial/venous disease [Medication Management] : medication management [Foot care/Footwear] : foot care/footwear

## 2021-08-16 NOTE — HISTORY OF PRESENT ILLNESS
[FreeTextEntry1] : 68 yo F with history of HTN, hypercholesterolemia, and RA presenting for evaluation of nonhealing R great toe ulcer adjacent to the nail bed. Ulcer has been present for over 5 months and has been treated with regular wound care by podiatry. She has also undergone noninvasive testing and diagnostic angiogram of RLE by Dr. Griggs, without intervention, per patient. She denies rest pain, but c/o significant pain at the site of the ulcer. She experiences intermittent claudication of bilateral LE. She denies smoking history and has not been diagnosed with diabetes. Denies history of SLE or vasculitis. She is currently taking methotrexate and prednisone for RA.\par \par Pt is s/p RLE angiogram and angioplasty and stent of TP trunk on 12/5/19. However, inline flow was not established to the foot, therefore, patient underwent a R pop (AK) to PT artery bypass with RSVG on 1/3/20. Her rest pain has resolved and her great toe ulcer is nearly healed. Bloody drainage has been noted from her medial thigh wound, just proximal to the origin of the bypass. She denies fever, chills. Her RLE has been edematous and managed with ACE wrap and leg elevation. There is some superficial blistering noted near the medial ankle incision. \par \par  [de-identified] : Pt s/p RLE angiogram and balloon angioplasty and recannulization of R pop-PT bypass RSVG on 9/16/20 due to thrombosed bypass and recurrent R foot rest pain and nonhealing right great toe ulcer. Pt doing well and states foot now feels warm. Right great toe wound has healed. She is on xarelto daily.\par She ambulates without claudication. Denies fever, chills.

## 2021-08-16 NOTE — PHYSICAL EXAM
[Normal Rate and Rhythm] : normal rate and rhythm [1+] : left 1+ [2+] : right 2+ [0] : left 0 [Ankle Swelling (On Exam)] : present [Ankle Swelling On The Right] : of the right ankle [Ankle Swelling On The Left] : moderate [Varicose Veins Of Lower Extremities] : not present [] : not present [Skin Ulcer] : ulcer [Alert] : alert [Oriented to Person] : oriented to person [Oriented to Place] : oriented to place [Oriented to Time] : oriented to time [Calm] : calm [de-identified] : NAD [de-identified] : no facial edema [de-identified] : supple [de-identified] : unlabored breathing [FreeTextEntry1] : < 2 sec cap refill; toes warm\par 2+ palpable graft pulse\par \par Some minimal yellow drainage, no foul odor\par no erythema; no cyanosis [de-identified] : soft, NT, ND [de-identified] : FROM of all 4 extremities

## 2021-08-30 ENCOUNTER — OUTPATIENT (OUTPATIENT)
Dept: OUTPATIENT SERVICES | Facility: HOSPITAL | Age: 71
LOS: 1 days | End: 2021-08-30
Payer: COMMERCIAL

## 2021-08-30 VITALS
HEART RATE: 50 BPM | WEIGHT: 152.56 LBS | RESPIRATION RATE: 20 BRPM | TEMPERATURE: 98 F | SYSTOLIC BLOOD PRESSURE: 130 MMHG | HEIGHT: 61 IN | DIASTOLIC BLOOD PRESSURE: 80 MMHG

## 2021-08-30 DIAGNOSIS — Z98.89 OTHER SPECIFIED POSTPROCEDURAL STATES: Chronic | ICD-10-CM

## 2021-08-30 DIAGNOSIS — I73.9 PERIPHERAL VASCULAR DISEASE, UNSPECIFIED: ICD-10-CM

## 2021-08-30 DIAGNOSIS — Z01.818 ENCOUNTER FOR OTHER PREPROCEDURAL EXAMINATION: ICD-10-CM

## 2021-08-30 DIAGNOSIS — Z98.891 HISTORY OF UTERINE SCAR FROM PREVIOUS SURGERY: Chronic | ICD-10-CM

## 2021-08-30 DIAGNOSIS — Z96.651 PRESENCE OF RIGHT ARTIFICIAL KNEE JOINT: Chronic | ICD-10-CM

## 2021-08-30 DIAGNOSIS — Z95.828 PRESENCE OF OTHER VASCULAR IMPLANTS AND GRAFTS: Chronic | ICD-10-CM

## 2021-08-30 DIAGNOSIS — Z29.9 ENCOUNTER FOR PROPHYLACTIC MEASURES, UNSPECIFIED: ICD-10-CM

## 2021-08-30 LAB
A1C WITH ESTIMATED AVERAGE GLUCOSE RESULT: 5.6 % — SIGNIFICANT CHANGE UP (ref 4–5.6)
ALBUMIN SERPL ELPH-MCNC: 3.9 G/DL — SIGNIFICANT CHANGE UP (ref 3.3–5.2)
ALP SERPL-CCNC: 112 U/L — SIGNIFICANT CHANGE UP (ref 40–120)
ALT FLD-CCNC: 7 U/L — SIGNIFICANT CHANGE UP
ANION GAP SERPL CALC-SCNC: 10 MMOL/L — SIGNIFICANT CHANGE UP (ref 5–17)
APTT BLD: 31.6 SEC — SIGNIFICANT CHANGE UP (ref 27.5–35.5)
AST SERPL-CCNC: 11 U/L — SIGNIFICANT CHANGE UP
BASOPHILS # BLD AUTO: 0.07 K/UL — SIGNIFICANT CHANGE UP (ref 0–0.2)
BASOPHILS NFR BLD AUTO: 1.3 % — SIGNIFICANT CHANGE UP (ref 0–2)
BILIRUB SERPL-MCNC: 0.7 MG/DL — SIGNIFICANT CHANGE UP (ref 0.4–2)
BUN SERPL-MCNC: 17.7 MG/DL — SIGNIFICANT CHANGE UP (ref 8–20)
CALCIUM SERPL-MCNC: 9.6 MG/DL — SIGNIFICANT CHANGE UP (ref 8.6–10.2)
CHLORIDE SERPL-SCNC: 105 MMOL/L — SIGNIFICANT CHANGE UP (ref 98–107)
CO2 SERPL-SCNC: 25 MMOL/L — SIGNIFICANT CHANGE UP (ref 22–29)
CREAT SERPL-MCNC: 0.63 MG/DL — SIGNIFICANT CHANGE UP (ref 0.5–1.3)
EOSINOPHIL # BLD AUTO: 0.28 K/UL — SIGNIFICANT CHANGE UP (ref 0–0.5)
EOSINOPHIL NFR BLD AUTO: 5.2 % — SIGNIFICANT CHANGE UP (ref 0–6)
ESTIMATED AVERAGE GLUCOSE: 114 MG/DL — SIGNIFICANT CHANGE UP (ref 68–114)
GLUCOSE SERPL-MCNC: 95 MG/DL — SIGNIFICANT CHANGE UP (ref 70–99)
HCT VFR BLD CALC: 43.2 % — SIGNIFICANT CHANGE UP (ref 34.5–45)
HGB BLD-MCNC: 14.2 G/DL — SIGNIFICANT CHANGE UP (ref 11.5–15.5)
IMM GRANULOCYTES NFR BLD AUTO: 0.2 % — SIGNIFICANT CHANGE UP (ref 0–1.5)
INR BLD: 1.13 RATIO — SIGNIFICANT CHANGE UP (ref 0.88–1.16)
LYMPHOCYTES # BLD AUTO: 1.57 K/UL — SIGNIFICANT CHANGE UP (ref 1–3.3)
LYMPHOCYTES # BLD AUTO: 29 % — SIGNIFICANT CHANGE UP (ref 13–44)
MCHC RBC-ENTMCNC: 29.5 PG — SIGNIFICANT CHANGE UP (ref 27–34)
MCHC RBC-ENTMCNC: 32.9 GM/DL — SIGNIFICANT CHANGE UP (ref 32–36)
MCV RBC AUTO: 89.6 FL — SIGNIFICANT CHANGE UP (ref 80–100)
MONOCYTES # BLD AUTO: 0.46 K/UL — SIGNIFICANT CHANGE UP (ref 0–0.9)
MONOCYTES NFR BLD AUTO: 8.5 % — SIGNIFICANT CHANGE UP (ref 2–14)
NEUTROPHILS # BLD AUTO: 3.02 K/UL — SIGNIFICANT CHANGE UP (ref 1.8–7.4)
NEUTROPHILS NFR BLD AUTO: 55.8 % — SIGNIFICANT CHANGE UP (ref 43–77)
PLATELET # BLD AUTO: 375 K/UL — SIGNIFICANT CHANGE UP (ref 150–400)
POTASSIUM SERPL-MCNC: 4.4 MMOL/L — SIGNIFICANT CHANGE UP (ref 3.5–5.3)
POTASSIUM SERPL-SCNC: 4.4 MMOL/L — SIGNIFICANT CHANGE UP (ref 3.5–5.3)
PROT SERPL-MCNC: 7.6 G/DL — SIGNIFICANT CHANGE UP (ref 6.6–8.7)
PROTHROM AB SERPL-ACNC: 13 SEC — SIGNIFICANT CHANGE UP (ref 10.6–13.6)
RBC # BLD: 4.82 M/UL — SIGNIFICANT CHANGE UP (ref 3.8–5.2)
RBC # FLD: 15.5 % — HIGH (ref 10.3–14.5)
SARS-COV-2 RNA SPEC QL NAA+PROBE: SIGNIFICANT CHANGE UP
SODIUM SERPL-SCNC: 140 MMOL/L — SIGNIFICANT CHANGE UP (ref 135–145)
WBC # BLD: 5.41 K/UL — SIGNIFICANT CHANGE UP (ref 3.8–10.5)
WBC # FLD AUTO: 5.41 K/UL — SIGNIFICANT CHANGE UP (ref 3.8–10.5)

## 2021-08-30 PROCEDURE — G0463: CPT

## 2021-08-30 PROCEDURE — 93010 ELECTROCARDIOGRAM REPORT: CPT

## 2021-08-30 PROCEDURE — 93005 ELECTROCARDIOGRAM TRACING: CPT

## 2021-08-30 RX ORDER — OXYBUTYNIN CHLORIDE 5 MG
1 TABLET ORAL
Qty: 0 | Refills: 0 | DISCHARGE

## 2021-08-30 NOTE — H&P PST ADULT - NSICDXPASTSURGICALHX_GEN_ALL_CORE_FT
PAST SURGICAL HISTORY:  S/P      S/P  x2    S/P femoral-popliteal bypass surgery     S/P total knee replacement, right 2017

## 2021-08-30 NOTE — H&P PST ADULT - NSICDXPASTMEDICALHX_GEN_ALL_CORE_FT
PAST MEDICAL HISTORY:  Chronic ulcer of toe right great toe    Claudication     COVID-19 vaccine series completed     H/O bronchiectasis     HLD (hyperlipidemia)     HTN (hypertension)     Hypertension     PAD (peripheral artery disease)     Rheumatoid arthritis     Rheumatoid arthritis     Sciatica, unspecified side

## 2021-08-30 NOTE — H&P PST ADULT - ASSESSMENT
70 yo female with PMHX of RA, HTN, RA s/p pop-tib bypass with stent of TP trunk (continues on aspirin and plavix) on 1/3/20 with Dr. Walter present today for pst . pt is s/p RLE angiogram and balloon angioplasty and recannulization fo right pop-PT bypass RSVG on 9/16/20 due to thrombosed bypass and recurrent right foot pain and rest and non healing right great toe ulcer. Patient and  reports great toe has healed and she is doing well on Xarelto daily. She does endorse continued claudication and is unable to walk 200ft. She reports needing to talk frequent breaks.  She also reports trouble sleeping at night due to  leg and back pain.  Reports temporary relief with Nsaids  but both pain would return. Arterial duplex demonstrates patent pop-PT bypass with decreased flow throughout and increased velocity appreciated at proximal anastomosis.   9/8/20 showed popliteal to distal posterior tibial artery vein bypass graft occlusion; low flow velocities. Patient now presents for Right Lower Extremity Angiogram possible Angioplasty / Stent with Dr. Walter.     Patient educated on written and verbal preop instructions.    Patient verbalized understanding after "teach back" method of instruction were given   Medications reviewed, instructions given on what medications to take and what not to take.  Pt instructed to stop Herbals or anti-inflammatory meds one week prior to surgery and discuss with PMD.     72 yo female with PMHX of RA, HTN, RA s/p pop-tib bypass with stent of TP trunk (continues on aspirin and plavix) on 1/3/20 with Dr. Walter present today for pst . pt is s/p RLE angiogram and balloon angioplasty and recannulization fo right pop-PT bypass RSVG on 9/16/20 due to thrombosed bypass and recurrent right foot pain and rest and non healing right great toe ulcer. Patient and  reports great toe has healed and she is doing well on Xarelto daily. She does endorse continued claudication and is unable to walk 200ft. She reports needing to talk frequent breaks.  She also reports trouble sleeping at night due to  leg and back pain.  Reports temporary relief with Nsaids  but both pain would return. Arterial duplex demonstrates patent pop-PT bypass with decreased flow throughout and increased velocity appreciated at proximal anastomosis.   9/8/20 showed popliteal to distal posterior tibial artery vein bypass graft occlusion; low flow velocities. Patient now presents for Right Lower Extremity Angiogram possible Angioplasty / Stent with Dr. Walter.     Patient educated on written and verbal preop instructions.    Patient verbalized understanding after "teach back" method of instruction were given   Medications reviewed, instructions given on what medications to take and what not to take.  Pt instructed to stop Herbals or anti-inflammatory meds one week prior to surgery and discuss with PMD.    ADDENDUM:    Pt seen and examined and there is no change in above physical exam  NPO status confirmed  Xarelto last dose 8/29  Consent to be obtained by MD prior to procedure

## 2021-08-30 NOTE — H&P PST ADULT - NSICDXFAMILYHX_GEN_ALL_CORE_FT
FAMILY HISTORY:  FH: heart disease    Father  Still living? Unknown  FH: heart attack, Age at diagnosis: Age Unknown  FH: rheumatoid arthritis, Age at diagnosis: Age Unknown    Mother  Still living? Unknown  FH: heart attack, Age at diagnosis: Age Unknown    Sibling  Still living? Unknown  FH: stroke, Age at diagnosis: Age Unknown

## 2021-08-30 NOTE — H&P PST ADULT - PROBLEM SELECTOR PLAN 1
Right Lower Extremity Angiogram possible Angioplasty / Stent with Dr. Walter on 9/1/21.     per Dr. Walter Patient is to continue ASA/Plavix and hold Xarelto for 2 days prior to procedure  Asked the patient to take the Blood pressure medication/ heart medication on DOP.

## 2021-09-01 ENCOUNTER — TRANSCRIPTION ENCOUNTER (OUTPATIENT)
Age: 71
End: 2021-09-01

## 2021-09-01 ENCOUNTER — APPOINTMENT (OUTPATIENT)
Dept: VASCULAR SURGERY | Facility: HOSPITAL | Age: 71
End: 2021-09-01

## 2021-09-01 ENCOUNTER — OUTPATIENT (OUTPATIENT)
Dept: OUTPATIENT SERVICES | Facility: HOSPITAL | Age: 71
LOS: 1 days | Discharge: HOME | End: 2021-09-01
Payer: COMMERCIAL

## 2021-09-01 VITALS
RESPIRATION RATE: 20 BRPM | TEMPERATURE: 98 F | DIASTOLIC BLOOD PRESSURE: 74 MMHG | HEART RATE: 61 BPM | OXYGEN SATURATION: 96 % | SYSTOLIC BLOOD PRESSURE: 150 MMHG

## 2021-09-01 VITALS — SYSTOLIC BLOOD PRESSURE: 150 MMHG | HEART RATE: 67 BPM | DIASTOLIC BLOOD PRESSURE: 78 MMHG

## 2021-09-01 DIAGNOSIS — Z98.891 HISTORY OF UTERINE SCAR FROM PREVIOUS SURGERY: Chronic | ICD-10-CM

## 2021-09-01 DIAGNOSIS — Z98.89 OTHER SPECIFIED POSTPROCEDURAL STATES: Chronic | ICD-10-CM

## 2021-09-01 DIAGNOSIS — I73.9 PERIPHERAL VASCULAR DISEASE, UNSPECIFIED: ICD-10-CM

## 2021-09-01 DIAGNOSIS — Z95.828 PRESENCE OF OTHER VASCULAR IMPLANTS AND GRAFTS: Chronic | ICD-10-CM

## 2021-09-01 DIAGNOSIS — Z96.651 PRESENCE OF RIGHT ARTIFICIAL KNEE JOINT: Chronic | ICD-10-CM

## 2021-09-01 LAB — BLD GP AB SCN SERPL QL: SIGNIFICANT CHANGE UP

## 2021-09-01 PROCEDURE — C1725: CPT

## 2021-09-01 PROCEDURE — 99152 MOD SED SAME PHYS/QHP 5/>YRS: CPT | Mod: GC

## 2021-09-01 PROCEDURE — 99152 MOD SED SAME PHYS/QHP 5/>YRS: CPT

## 2021-09-01 PROCEDURE — C1887: CPT

## 2021-09-01 PROCEDURE — C1769: CPT

## 2021-09-01 PROCEDURE — 86901 BLOOD TYPING SEROLOGIC RH(D): CPT

## 2021-09-01 PROCEDURE — 37224: CPT | Mod: GC

## 2021-09-01 PROCEDURE — 36415 COLL VENOUS BLD VENIPUNCTURE: CPT

## 2021-09-01 PROCEDURE — 37224: CPT

## 2021-09-01 PROCEDURE — 86850 RBC ANTIBODY SCREEN: CPT

## 2021-09-01 PROCEDURE — C1760: CPT

## 2021-09-01 PROCEDURE — 99153 MOD SED SAME PHYS/QHP EA: CPT

## 2021-09-01 PROCEDURE — 76937 US GUIDE VASCULAR ACCESS: CPT | Mod: 26,GC

## 2021-09-01 PROCEDURE — C1894: CPT

## 2021-09-01 PROCEDURE — 86900 BLOOD TYPING SEROLOGIC ABO: CPT

## 2021-09-01 PROCEDURE — 37228: CPT | Mod: GC

## 2021-09-01 RX ORDER — METHOTREXATE 2.5 MG/1
6 TABLET ORAL
Qty: 0 | Refills: 0 | DISCHARGE

## 2021-09-01 RX ORDER — OMEGA-3 ACID ETHYL ESTERS 1 G
1 CAPSULE ORAL
Qty: 0 | Refills: 0 | DISCHARGE

## 2021-09-01 RX ORDER — ACETAMINOPHEN 500 MG
1000 TABLET ORAL ONCE
Refills: 0 | Status: COMPLETED | OUTPATIENT
Start: 2021-09-01 | End: 2021-09-01

## 2021-09-01 RX ORDER — OXYCODONE AND ACETAMINOPHEN 5; 325 MG/1; MG/1
1 TABLET ORAL EVERY 4 HOURS
Refills: 0 | Status: DISCONTINUED | OUTPATIENT
Start: 2021-09-01 | End: 2021-09-01

## 2021-09-01 RX ORDER — AMLODIPINE BESYLATE 2.5 MG/1
1 TABLET ORAL
Qty: 0 | Refills: 0 | DISCHARGE

## 2021-09-01 RX ORDER — ASPIRIN/CALCIUM CARB/MAGNESIUM 324 MG
1 TABLET ORAL
Qty: 0 | Refills: 0 | DISCHARGE

## 2021-09-01 RX ORDER — PREGABALIN 225 MG/1
1 CAPSULE ORAL
Qty: 0 | Refills: 0 | DISCHARGE

## 2021-09-01 RX ORDER — OXYCODONE AND ACETAMINOPHEN 5; 325 MG/1; MG/1
2 TABLET ORAL EVERY 4 HOURS
Refills: 0 | Status: DISCONTINUED | OUTPATIENT
Start: 2021-09-01 | End: 2021-09-01

## 2021-09-01 RX ORDER — CHOLECALCIFEROL (VITAMIN D3) 125 MCG
1 CAPSULE ORAL
Qty: 0 | Refills: 0 | DISCHARGE

## 2021-09-01 RX ORDER — CLOPIDOGREL BISULFATE 75 MG/1
1 TABLET, FILM COATED ORAL
Qty: 90 | Refills: 0
Start: 2021-09-01

## 2021-09-01 RX ORDER — SULFASALAZINE 500 MG
3 TABLET ORAL
Qty: 0 | Refills: 0 | DISCHARGE

## 2021-09-01 RX ORDER — FOLIC ACID 0.8 MG
1 TABLET ORAL
Qty: 0 | Refills: 0 | DISCHARGE

## 2021-09-01 RX ORDER — KETOROLAC TROMETHAMINE 30 MG/ML
30 SYRINGE (ML) INJECTION ONCE
Refills: 0 | Status: DISCONTINUED | OUTPATIENT
Start: 2021-09-01 | End: 2021-09-01

## 2021-09-01 RX ORDER — SIMVASTATIN 20 MG/1
1 TABLET, FILM COATED ORAL
Qty: 0 | Refills: 0 | DISCHARGE

## 2021-09-01 RX ORDER — CLOPIDOGREL BISULFATE 75 MG/1
75 TABLET, FILM COATED ORAL ONCE
Refills: 0 | Status: COMPLETED | OUTPATIENT
Start: 2021-09-01 | End: 2021-09-01

## 2021-09-01 RX ORDER — CHLORHEXIDINE GLUCONATE 213 G/1000ML
1 SOLUTION TOPICAL ONCE
Refills: 0 | Status: DISCONTINUED | OUTPATIENT
Start: 2021-09-01 | End: 2021-09-15

## 2021-09-01 RX ORDER — LOSARTAN/HYDROCHLOROTHIAZIDE 100MG-25MG
1 TABLET ORAL
Qty: 0 | Refills: 0 | DISCHARGE

## 2021-09-01 RX ORDER — ZINC SULFATE TAB 220 MG (50 MG ZINC EQUIVALENT) 220 (50 ZN) MG
1 TAB ORAL
Qty: 0 | Refills: 0 | DISCHARGE

## 2021-09-01 RX ADMIN — CLOPIDOGREL BISULFATE 75 MILLIGRAM(S): 75 TABLET, FILM COATED ORAL at 08:57

## 2021-09-01 RX ADMIN — Medication 30 MILLIGRAM(S): at 15:07

## 2021-09-01 RX ADMIN — OXYCODONE AND ACETAMINOPHEN 1 TABLET(S): 5; 325 TABLET ORAL at 14:30

## 2021-09-01 RX ADMIN — Medication 400 MILLIGRAM(S): at 12:00

## 2021-09-01 RX ADMIN — Medication 30 MILLIGRAM(S): at 14:30

## 2021-09-01 RX ADMIN — OXYCODONE AND ACETAMINOPHEN 1 TABLET(S): 5; 325 TABLET ORAL at 13:49

## 2021-09-01 NOTE — PROGRESS NOTE ADULT - SUBJECTIVE AND OBJECTIVE BOX
POST PROCEDURE NOTE:    Patient is a 71y old  Female who presents with a chief complaint of RLE/foot pain    Now s/p RLE angiogram via LFA performed by Dr. Jeff Becerra  with  80% stenosis proximal pop-PT bypass graft which she underwent POBA.  She received IA heparin with closing  and a Mynx closure device was used. She received IV sedation intraprocedurally, arrived to recovery in NAD and HDS, LFA access site stable, no bleed/hematoma, distal PT graft pulse +  Plan for discharge after recovery period completed.    Vital Signs Last 24 Hrs  T(C): 36.4 (01 Sep 2021 08:40), Max: 36.4 (01 Sep 2021 08:40)  T(F): 97.6 (01 Sep 2021 08:40), Max: 97.6 (01 Sep 2021 08:40)  HR: --  BP: --  BP(mean): --  RR: 20 (01 Sep 2021 08:40) (20 - 20)  SpO2: 96% (01 Sep 2021 08:40) (96% - 96%)  I&O's Detail      PAST MEDICAL & SURGICAL HISTORY:  Hypertension    Rheumatoid arthritis    Rheumatoid arthritis    PAD (peripheral artery disease)    Chronic ulcer of toe  right great toe    HTN (hypertension)    HLD (hyperlipidemia)    H/O bronchiectasis    Claudication    Sciatica, unspecified side    COVID-19 vaccine series completed    S/P     S/P total knee replacement, right  2017    S/P   x2    S/P femoral-popliteal bypass surgery      Physical Exam:  General: NAD, resting comfortably in bed  Pulmonary: Nonlabored breathing, no respiratory distress, CTA  Cardiovascular: Normal S1, S2  Abdominal: soft, NT/ND  Extremities: L FA site soft, without hematoma or ecchymosis. RLE WWP with palp PT graft pulse      LABS:                        14.2   5.41  )-----------( 375      ( 30 Aug 2021 13:56 )             43.2     08-30    140  |  105  |  17.7  ----------------------------<  95  4.4   |  25.0  |  0.63    Ca    9.6      30 Aug 2021 13:56    TPro  7.6  /  Alb  3.9  /  TBili  0.7  /  DBili  x   /  AST  11  /  ALT  7   /  AlkPhos  112  08-30    PT/INR - ( 30 Aug 2021 13:56 )   PT: 13.0 sec;   INR: 1.13 ratio         PTT - ( 30 Aug 2021 13:56 )  PTT:31.6 sec  CAPILLARY BLOOD GLUCOSE          Radiology and Additional Studies:    Assessment:71y Female PMHX of RA, HTN, RA s/p pop-tib bypass with stent of TP trunk (continues on aspirin and plavix) on 1/3/20 with Dr. Walter present today for pst . pt is s/p RLE angiogram and balloon angioplasty and recannulization fo right pop-PT bypass RSVG on 20 due to thrombosed bypass and recurrent right foot pain and rest and non healing right great toe ulcer. Patient was started on Xarelto daily. She does endorse continued claudication and is unable to walk 200ft. She reports needing to talk frequent breaks.  She also reports trouble sleeping at night due to  leg and back pain. Arterial duplex demonstrates patent pop-PT bypass with decreased flow throughout and increased velocity appreciated at proximal anastomosis.        S/P RLE angiogram with  80% stenosis proximal pop-PT bypass graft S/P POBA  Tolerated procedure well    Plan:  Resume Xarelto and Plavix tomorrow  Cont current home meds  Suture removal in 1 week  Groin precautions  Follow up with Dr. Jeff Becerra in 1 week  Discharge home after recovery period completed

## 2021-09-01 NOTE — DISCHARGE NOTE PROVIDER - HOSPITAL COURSE
71y Female PMHX of RA, HTN, RA s/p pop-tib bypass with stent of TP trunk (continues on aspirin and plavix) on 1/3/20 with Dr. Walter present today for pst . pt is s/p RLE angiogram and balloon angioplasty and recannulization fo right pop-PT bypass RSVG on 9/16/20 due to thrombosed bypass and recurrent right foot pain and rest and non healing right great toe ulcer. Patient was started on Xarelto daily. She does endorse continued claudication and is unable to walk 200ft. She reports needing to talk frequent breaks.  She also reports trouble sleeping at night due to  leg and back pain. Arterial duplex demonstrates patent pop-PT bypass with decreased flow throughout and increased velocity appreciated at proximal anastomosis.        S/P RLE angiogram with  80% stenosis proximal pop-PT bypass graft S/P POBA  Tolerated procedure well  OOB and ambulating  Stable for discharge home

## 2021-09-01 NOTE — DISCHARGE NOTE NURSING/CASE MANAGEMENT/SOCIAL WORK - PATIENT PORTAL LINK FT
You can access the FollowMyHealth Patient Portal offered by [facility] by registering at the following website: [email or web address]. By joining [facility]’s FollowMyHealth portal, you will also be able to view your health information using other applications (apps) compatible with our system.

## 2021-09-01 NOTE — CHART NOTE - NSCHARTNOTEFT_GEN_A_CORE
Pt noted with oozing from L groin suture site  No hematoma. Groin soft.   Manual pressure held and continued to ooze.  Site cleaned and injected with 0.5cc epi superficially and D STAT applied.  Will cont to monitor   Discharge pending reeval  Vascular Surgery Team aware

## 2021-09-01 NOTE — DISCHARGE NOTE NURSING/CASE MANAGEMENT/SOCIAL WORK - NSDCPEFALRISK_GEN_ALL_CORE
For information on Fall & injury Prevention, visit [email or web address]

## 2021-09-01 NOTE — DISCHARGE NOTE PROVIDER - NSDCMRMEDTOKEN_GEN_ALL_CORE_FT
Aspirin Enteric Coated 81 mg oral delayed release tablet: 1 tab(s) orally once a day start   biotin 5000 mcg oral tablet, disintegratin tab(s) orally once a day  clopidogrel 75 mg oral tablet: 1 tab(s) orally once a day start   D 1000 intl units (25 mcg) oral tablet: 1 tab(s) orally once a day  Fish Oil 1000 mg oral capsule: 1 cap(s) orally once a day  folic acid 1 mg oral tablet: 1 tab(s) orally once a day  gabapentin 300 mg oral capsule: 3 cap(s) orally 2 times a day  glucosamine hydrochloride 1500 mg oral tablet: 1 tab(s) orally once a day  losartan-hydrochlorothiazide 50mg-12.5mg oral tablet: 1 tab(s) orally once a day  methotrexate 2.5 mg oral tablet: 6 tab(s) orally every 7 days  Multiple Vitamins oral tablet: 1 tab(s) orally once a day  Protonix 40 mg oral delayed release tablet: 1 tab(s) orally once a day   rivaroxaban 20 mg oral tablet: 1 tab(s) orally once a day start   simvastatin 40 mg oral tablet: 1 tab(s) orally once a day (at bedtime)  sulfaSALAzine 500 mg oral delayed release tablet: 3 tab(s) orally 2 times a day  Vitamin B12 1000 mcg oral tablet: 1 tab(s) orally once a day  Zinc 140 mg (as elemental zinc 50 mg) oral tablet: 1 tab(s) orally once a day

## 2021-09-01 NOTE — DISCHARGE NOTE PROVIDER - NSDCCPTREATMENT_GEN_ALL_CORE_FT
PRINCIPAL PROCEDURE  Procedure: Angioplasty, femoropopliteal  Findings and Treatment: POBA prox pop-PT bypass graft

## 2021-09-01 NOTE — DISCHARGE NOTE PROVIDER - CARE PROVIDER_API CALL
ManvarSingh, Pallavi B (MD)  Vascular Surgery  [address], [address] [place] [address]  Phone: [phone]  Fax: [phone]  Follow Up Time: 1 week

## 2021-09-01 NOTE — BRIEF OPERATIVE NOTE - COMMENTS
Wound Class I  Minx device closure of femoral artery (left) puncture site   Patient will lie flat 2hours, suture placed to skin for hemostasis (figure of eight, prolene 3-0) Wound Class I  Minx device closure of femoral artery (left) puncture site   Patient will lie flat 2hours, suture placed to skin for hemostasis (figure of eight, prolene 3-0)  Patient had palpable PT after angioplasty

## 2021-09-01 NOTE — BRIEF OPERATIVE NOTE - NSICDXBRIEFPROCEDURE_GEN_ALL_CORE_FT
PROCEDURES:  Angiogram selective 01-Sep-2021 12:30:48  [facility]  Angioplasty, artery, popliteal 01-Sep-2021 12:32:22  [facility]  Angioplasty of posterior tibial artery 01-Sep-2021 12:32:41  [facility]

## 2021-09-01 NOTE — DISCHARGE NOTE PROVIDER - NSDCCPCAREPLAN_GEN_ALL_CORE_FT
PRINCIPAL DISCHARGE DIAGNOSIS  Diagnosis: PAD (peripheral artery disease)  Assessment and Plan of Treatment:

## 2021-09-09 DIAGNOSIS — I70.411 ATHEROSCLEROSIS OF AUTOLOGOUS VEIN BYPASS GRAFT(S) OF THE EXTREMITIES WITH INTERMITTENT CLAUDICATION, RIGHT LEG: ICD-10-CM

## 2021-09-24 RX ORDER — CLOPIDOGREL BISULFATE 75 MG/1
75 TABLET, FILM COATED ORAL
Qty: 90 | Refills: 3 | Status: COMPLETED | COMMUNITY
Start: 2019-12-05 | End: 2021-09-24

## 2021-09-24 RX ORDER — FOLIC ACID 1 MG/1
1 TABLET ORAL
Refills: 0 | Status: COMPLETED | COMMUNITY
End: 2021-09-24

## 2021-09-24 RX ORDER — DOXYCYCLINE HYCLATE 100 MG/1
100 TABLET ORAL
Qty: 20 | Refills: 0 | Status: COMPLETED | COMMUNITY
Start: 2019-09-12 | End: 2021-09-24

## 2021-09-24 RX ORDER — ASPIRIN 81 MG
81 TABLET, DELAYED RELEASE (ENTERIC COATED) ORAL
Refills: 0 | Status: COMPLETED | COMMUNITY
End: 2021-09-24

## 2021-09-24 RX ORDER — DOXYCYCLINE HYCLATE 100 MG/1
100 CAPSULE ORAL DAILY
Qty: 7 | Refills: 0 | Status: COMPLETED | COMMUNITY
Start: 2019-10-05 | End: 2021-09-24

## 2021-09-24 RX ORDER — SODIUM HYPOCHLORITE 2.5 MG/ML
0.25 SOLUTION TOPICAL
Qty: 2 | Refills: 0 | Status: COMPLETED | COMMUNITY
Start: 2020-01-27 | End: 2021-09-24

## 2022-02-02 NOTE — DISCHARGE NOTE NURSING/CASE MANAGEMENT/SOCIAL WORK - NSSCTYPOFSERV_GEN_ALL_CORE
Body Location Override (Optional - Billing Will Still Be Based On Selected Body Map Location If Applicable): left lateral neck Detail Level: Detailed Add 86846 Cpt? (Important Note: In 2017 The Use Of 02696 Is Being Tracked By Cms To Determine Future Global Period Reimbursement For Global Periods): yes RN Visit/PT

## 2022-05-02 NOTE — ED ADULT TRIAGE NOTE - ESI TRIAGE ACUITY LEVEL, MLM
32 y.o. female  at 36w5d  Reports + FM, denies VB, LOF or regular CTX  Doing well without concerns. Denies ANY pre-e s/s. Bilateral patellar reflexes +1.  GBS result +, per [facility], to fax results.   The skin of the suprapubic region was evaluated and appears intact.    Discussed IOL at 37 weeks for GHTN. Will do labs today and if elevated will send to L&D. STRESSED s/s of pre e and when to come. IOL set  @ 0000.  Reviewed warning signs, normal FKCs, labor precautions and how/when to call.    
2

## 2022-09-08 NOTE — H&P PST ADULT - VENOUS THROMBOEMBOLISM CURRENT STATUS
-3
(1) other risk factor (includes escalating BMI, pack-years of smoking, diabetes requiring insulin, chemotherapy, female gender and length of surgery)

## 2022-12-02 ENCOUNTER — RX RENEWAL (OUTPATIENT)
Age: 72
End: 2022-12-02

## 2023-01-31 NOTE — H&P PST ADULT - LAST STRESS TEST
2019 Expected Date Of Service: 01/31/2023 Billing Type: Third-Party Bill Performing Laboratory: [id number] Bill For Surgical Tray: no

## 2023-05-26 NOTE — ED ADULT TRIAGE NOTE - ACCOMPANIED BY
· CONSTITUTIONAL: no fever and no chills.  · MUSCULOSKELETAL: - - -  · Musculoskeletal [+]: JOINT PAIN, LIMITED RANGE OF MOTION  · Musculoskeletal [-]: no back pain, no calf pain, no neck pain
Parent

## 2023-06-02 PROBLEM — M54.30 SCIATICA, UNSPECIFIED SIDE: Chronic | Status: ACTIVE | Noted: 2021-08-30

## 2023-06-02 PROBLEM — Z92.29 PERSONAL HISTORY OF OTHER DRUG THERAPY: Chronic | Status: ACTIVE | Noted: 2021-08-30

## 2023-06-19 ENCOUNTER — APPOINTMENT (OUTPATIENT)
Dept: FAMILY MEDICINE | Facility: CLINIC | Age: 73
End: 2023-06-19

## 2023-07-25 ENCOUNTER — APPOINTMENT (OUTPATIENT)
Dept: FAMILY MEDICINE | Facility: CLINIC | Age: 73
End: 2023-07-25
Payer: COMMERCIAL

## 2023-07-25 VITALS
SYSTOLIC BLOOD PRESSURE: 140 MMHG | WEIGHT: 159 LBS | OXYGEN SATURATION: 97 % | BODY MASS INDEX: 34.3 KG/M2 | HEIGHT: 57 IN | DIASTOLIC BLOOD PRESSURE: 78 MMHG | HEART RATE: 63 BPM

## 2023-07-25 DIAGNOSIS — I73.9 PERIPHERAL VASCULAR DISEASE, UNSPECIFIED: ICD-10-CM

## 2023-07-25 DIAGNOSIS — J98.4 OTHER DISORDERS OF LUNG: ICD-10-CM

## 2023-07-25 DIAGNOSIS — Z13.29 ENCOUNTER FOR SCREENING FOR OTHER SUSPECTED ENDOCRINE DISORDER: ICD-10-CM

## 2023-07-25 DIAGNOSIS — Z87.898 PERSONAL HISTORY OF OTHER SPECIFIED CONDITIONS: ICD-10-CM

## 2023-07-25 DIAGNOSIS — Z13.820 ENCOUNTER FOR SCREENING FOR OSTEOPOROSIS: ICD-10-CM

## 2023-07-25 DIAGNOSIS — M79.604 PAIN IN RIGHT LEG: ICD-10-CM

## 2023-07-25 DIAGNOSIS — Z23 ENCOUNTER FOR IMMUNIZATION: ICD-10-CM

## 2023-07-25 DIAGNOSIS — T81.30XA DISRUPTION OF WOUND, UNSPECIFIED, INITIAL ENCOUNTER: ICD-10-CM

## 2023-07-25 DIAGNOSIS — Z12.39 ENCOUNTER FOR OTHER SCREENING FOR MALIGNANT NEOPLASM OF BREAST: ICD-10-CM

## 2023-07-25 DIAGNOSIS — E66.9 OBESITY, UNSPECIFIED: ICD-10-CM

## 2023-07-25 DIAGNOSIS — Z00.00 ENCOUNTER FOR GENERAL ADULT MEDICAL EXAMINATION W/OUT ABNORMAL FINDINGS: ICD-10-CM

## 2023-07-25 DIAGNOSIS — Z86.010 PERSONAL HISTORY OF COLONIC POLYPS: ICD-10-CM

## 2023-07-25 DIAGNOSIS — M17.9 OSTEOARTHRITIS OF KNEE, UNSPECIFIED: ICD-10-CM

## 2023-07-25 DIAGNOSIS — M06.9 RHEUMATOID ARTHRITIS, UNSPECIFIED: ICD-10-CM

## 2023-07-25 DIAGNOSIS — J47.9 BRONCHIECTASIS, UNCOMPLICATED: ICD-10-CM

## 2023-07-25 DIAGNOSIS — Z13.21 ENCOUNTER FOR SCREENING FOR NUTRITIONAL DISORDER: ICD-10-CM

## 2023-07-25 DIAGNOSIS — S91.109A UNSPECIFIED OPEN WOUND OF UNSPECIFIED TOE(S) W/OUT DAMAGE TO NAIL, INITIAL ENCOUNTER: ICD-10-CM

## 2023-07-25 DIAGNOSIS — Z13.1 ENCOUNTER FOR SCREENING FOR DIABETES MELLITUS: ICD-10-CM

## 2023-07-25 PROCEDURE — G0439: CPT

## 2023-07-25 PROCEDURE — 36415 COLL VENOUS BLD VENIPUNCTURE: CPT

## 2023-07-25 PROCEDURE — G0444 DEPRESSION SCREEN ANNUAL: CPT

## 2023-07-25 RX ORDER — MUPIROCIN 20 MG/G
2 OINTMENT TOPICAL
Qty: 22 | Refills: 0 | Status: DISCONTINUED | COMMUNITY
Start: 2019-12-02 | End: 2023-07-25

## 2023-07-25 RX ORDER — AMLODIPINE BESYLATE 10 MG/1
10 TABLET ORAL
Qty: 90 | Refills: 0 | Status: DISCONTINUED | COMMUNITY
Start: 2020-01-29 | End: 2023-07-25

## 2023-07-25 RX ORDER — HYDROCHLOROTHIAZIDE 12.5 MG/1
12.5 CAPSULE ORAL
Qty: 90 | Refills: 0 | Status: DISCONTINUED | COMMUNITY
Start: 2021-02-23 | End: 2023-07-25

## 2023-07-25 RX ORDER — HYDROCHLOROTHIAZIDE 12.5 MG/1
12.5 TABLET ORAL
Qty: 90 | Refills: 0 | Status: DISCONTINUED | COMMUNITY
Start: 2019-10-30 | End: 2023-07-25

## 2023-07-25 RX ORDER — TRAMADOL HYDROCHLORIDE 50 MG/1
50 TABLET, COATED ORAL
Qty: 28 | Refills: 0 | Status: DISCONTINUED | COMMUNITY
Start: 2020-01-31 | End: 2023-07-25

## 2023-07-25 RX ORDER — FLUTICASONE PROPIONATE 50 MCG
50 SPRAY, SUSPENSION NASAL
Refills: 0 | Status: DISCONTINUED | COMMUNITY
End: 2023-07-25

## 2023-07-25 RX ORDER — RIVAROXABAN 10 MG/1
10 TABLET, FILM COATED ORAL
Qty: 90 | Refills: 3 | Status: DISCONTINUED | COMMUNITY
Start: 2021-08-16 | End: 2023-07-25

## 2023-07-25 RX ORDER — LOSARTAN POTASSIUM AND HYDROCHLOROTHIAZIDE 12.5; 5 MG/1; MG/1
50-12.5 TABLET ORAL
Refills: 0 | Status: DISCONTINUED | COMMUNITY
End: 2023-07-25

## 2023-07-25 RX ORDER — ALBUTEROL SULFATE 108 UG/1
108 (90 BASE) AEROSOL, METERED RESPIRATORY (INHALATION)
Refills: 0 | Status: DISCONTINUED | COMMUNITY
End: 2023-07-25

## 2023-07-25 RX ORDER — RANITIDINE 150 MG/1
150 TABLET ORAL
Qty: 180 | Refills: 3 | Status: DISCONTINUED | COMMUNITY
Start: 2018-02-15 | End: 2023-07-25

## 2023-07-25 RX ORDER — TRAMADOL HYDROCHLORIDE 50 MG/1
50 TABLET, COATED ORAL 3 TIMES DAILY
Qty: 20 | Refills: 0 | Status: DISCONTINUED | COMMUNITY
Start: 2020-02-17 | End: 2023-07-25

## 2023-07-25 RX ORDER — SULFASALAZINE 500 MG/1
500 TABLET ORAL
Refills: 0 | Status: DISCONTINUED | COMMUNITY
End: 2023-07-25

## 2023-07-25 RX ORDER — TOFACITINIB 11 MG/1
TABLET, FILM COATED, EXTENDED RELEASE ORAL
Refills: 0 | Status: ACTIVE | COMMUNITY
Start: 2023-07-25

## 2023-07-25 RX ORDER — GABAPENTIN 300 MG/1
300 CAPSULE ORAL
Qty: 90 | Refills: 0 | Status: DISCONTINUED | COMMUNITY
Start: 2020-01-21 | End: 2023-07-25

## 2023-07-25 RX ORDER — PANTOPRAZOLE SODIUM 40 MG/1
40 GRANULE, DELAYED RELEASE ORAL
Refills: 0 | Status: DISCONTINUED | COMMUNITY
End: 2023-07-25

## 2023-07-25 RX ORDER — COLLAGENASE SANTYL 250 [ARB'U]/G
250 OINTMENT TOPICAL
Qty: 90 | Refills: 0 | Status: DISCONTINUED | COMMUNITY
Start: 2020-01-12 | End: 2023-07-25

## 2023-07-25 RX ORDER — OXYCODONE AND ACETAMINOPHEN 5; 325 MG/1; MG/1
5-325 TABLET ORAL
Qty: 20 | Refills: 0 | Status: DISCONTINUED | COMMUNITY
Start: 2020-01-21 | End: 2023-07-25

## 2023-07-25 RX ORDER — PANTOPRAZOLE SODIUM 40 MG/1
40 TABLET, DELAYED RELEASE ORAL
Refills: 0 | Status: DISCONTINUED | COMMUNITY
End: 2023-07-25

## 2023-07-25 RX ORDER — SIMVASTATIN 10 MG/1
10 TABLET, FILM COATED ORAL
Refills: 0 | Status: DISCONTINUED | COMMUNITY
End: 2023-07-25

## 2023-07-25 RX ORDER — PNEUMOCOCCAL 23-VAL P-SAC VAC 25MCG/0.5
25 VIAL (ML) INJECTION
Qty: 1 | Refills: 0 | Status: DISCONTINUED | COMMUNITY
Start: 2019-11-20 | End: 2023-07-25

## 2023-07-25 RX ORDER — SIMVASTATIN 40 MG/1
40 TABLET, FILM COATED ORAL
Refills: 0 | Status: DISCONTINUED | COMMUNITY
End: 2023-07-25

## 2023-07-25 RX ORDER — IBUPROFEN 200 MG/1
200 CAPSULE, LIQUID FILLED ORAL
Refills: 0 | Status: DISCONTINUED | COMMUNITY
End: 2023-07-25

## 2023-07-25 NOTE — HISTORY OF PRESENT ILLNESS
[FreeTextEntry1] : DWAYNE [de-identified] : 72 yo female presents for annual physical. Reports feeling well. Has a hx of Rheumatoid arthritis. Reports that her rheumatologist has retired and she is establishing care later this month. Denies fever, chills, cp, palpitations, sob, nvcd.\par

## 2023-07-25 NOTE — ASSESSMENT
[FreeTextEntry1] : Annual physical: f/u routine labwork\par Breast screening: f/u mammo\par HLD: Recommend low fat diet, wt loss, low int exercise, nutritional counseling provided, f/u lipid panel\par RA: c/w current regimen, establishing care with rheumatology later this month, f/u rheumatology\par Restrictive lung disease/bronchiectasis: currently asymptomatic, c/w albuterol hfa prn for sob/wheezing, f/u pulmonology\par PAD: c/w current regimen, f/u cardiology, f/u vasc sx\par OA of knee: recommend low-mod int exercise/stretching, s/p knee replacement, f/u orthopedist\par HTN: bp within acceptable range, c/w current regimen, recommend low salt diet, wt loss, low int exercise, DASH diet\par Obesity: Recommend low fat diet, wt loss, exercise, and DASH diet\par RTC 3 wks

## 2023-07-25 NOTE — COUNSELING
[Fall prevention counseling provided] : Fall prevention counseling provided [Potential consequences of obesity discussed] : Potential consequences of obesity discussed [Benefits of weight loss discussed] : Benefits of weight loss discussed [Encouraged to maintain food diary] : Encouraged to maintain food diary [Encouraged to increase physical activity] : Encouraged to increase physical activity [Encouraged to use exercise tracking device] : Encouraged to use exercise tracking device

## 2023-07-25 NOTE — HEALTH RISK ASSESSMENT
[Good] : ~his/her~  mood as  good [1 or 2 (0 pts)] : 1 or 2 (0 points) [Never (0 pts)] : Never (0 points) [No] : In the past 12 months have you used drugs other than those required for medical reasons? No [No falls in past year] : Patient reported no falls in the past year [0] : 2) Feeling down, depressed, or hopeless: Not at all (0) [PHQ-2 Negative - No further assessment needed] : PHQ-2 Negative - No further assessment needed [Audit-CScore] : 0 [de-identified] : needs improvement [de-identified] : healthy [ZHT4Catfe] : 0 [Patient reported mammogram was normal] : Patient reported mammogram was normal [Patient declined PAP Smear] : Patient declined PAP Smear [Patient reported bone density results were normal] : Patient reported bone density results were normal [Patient reported colonoscopy was normal] : Patient reported colonoscopy was normal [HIV test declined] : HIV test declined [Hepatitis C test declined] : Hepatitis C test declined [With Family] : lives with family [Retired] : retired [] :  [Sexually Active] : sexually active [High Risk Behavior] : no high risk behavior [Feels Safe at Home] : Feels safe at home [Fully functional (bathing, dressing, toileting, transferring, walking, feeding)] : Fully functional (bathing, dressing, toileting, transferring, walking, feeding) [Fully functional (using the telephone, shopping, preparing meals, housekeeping, doing laundry, using] : Fully functional and needs no help or supervision to perform IADLs (using the telephone, shopping, preparing meals, housekeeping, doing laundry, using transportation, managing medications and managing finances) [Reports changes in hearing] : Reports no changes in hearing [Reports changes in vision] : Reports no changes in vision [Reports changes in dental health] : Reports no changes in dental health [MammogramDate] : 01/20 [BoneDensityDate] : 01/20 [ColonoscopyDate] : 01/20 [Never] : Never

## 2023-08-01 LAB
25(OH)D3 SERPL-MCNC: 48.6 NG/ML
ALBUMIN SERPL ELPH-MCNC: 3.9 G/DL
ALP BLD-CCNC: 123 U/L
ALT SERPL-CCNC: 10 U/L
ANION GAP SERPL CALC-SCNC: 12 MMOL/L
APPEARANCE: CLEAR
AST SERPL-CCNC: 16 U/L
BACTERIA: ABNORMAL /HPF
BILIRUB SERPL-MCNC: 0.5 MG/DL
BILIRUBIN URINE: NEGATIVE
BLOOD URINE: ABNORMAL
BUN SERPL-MCNC: 19 MG/DL
CALCIUM SERPL-MCNC: 10.1 MG/DL
CAST: 0 /LPF
CHLORIDE SERPL-SCNC: 103 MMOL/L
CHOLEST SERPL-MCNC: 194 MG/DL
CO2 SERPL-SCNC: 24 MMOL/L
COLOR: YELLOW
CREAT SERPL-MCNC: 0.77 MG/DL
EGFR: 82 ML/MIN/1.73M2
EPITHELIAL CELLS: 6 /HPF
ESTIMATED AVERAGE GLUCOSE: 120 MG/DL
GLUCOSE QUALITATIVE U: NEGATIVE MG/DL
GLUCOSE SERPL-MCNC: 116 MG/DL
HBA1C MFR BLD HPLC: 5.8 %
HDLC SERPL-MCNC: 49 MG/DL
KETONES URINE: ABNORMAL MG/DL
LDLC SERPL CALC-MCNC: 115 MG/DL
LEUKOCYTE ESTERASE URINE: ABNORMAL
MICROSCOPIC-UA: NORMAL
NITRITE URINE: NEGATIVE
NONHDLC SERPL-MCNC: 145 MG/DL
PH URINE: 5.5
POTASSIUM SERPL-SCNC: 4.1 MMOL/L
PROT SERPL-MCNC: 7.2 G/DL
PROTEIN URINE: NEGATIVE MG/DL
RED BLOOD CELLS URINE: 6 /HPF
SODIUM SERPL-SCNC: 139 MMOL/L
SPECIFIC GRAVITY URINE: 1.03
TRIGL SERPL-MCNC: 170 MG/DL
TSH SERPL-ACNC: 0.57 UIU/ML
UROBILINOGEN URINE: 1 MG/DL
WHITE BLOOD CELLS URINE: 3 /HPF

## 2024-03-12 ENCOUNTER — OUTPATIENT (OUTPATIENT)
Dept: OUTPATIENT SERVICES | Facility: HOSPITAL | Age: 74
LOS: 1 days | End: 2024-03-12
Payer: COMMERCIAL

## 2024-03-12 ENCOUNTER — APPOINTMENT (OUTPATIENT)
Dept: MAMMOGRAPHY | Facility: CLINIC | Age: 74
End: 2024-03-12

## 2024-03-12 ENCOUNTER — RESULT REVIEW (OUTPATIENT)
Age: 74
End: 2024-03-12

## 2024-03-12 DIAGNOSIS — Z96.651 PRESENCE OF RIGHT ARTIFICIAL KNEE JOINT: Chronic | ICD-10-CM

## 2024-03-12 DIAGNOSIS — Z95.828 PRESENCE OF OTHER VASCULAR IMPLANTS AND GRAFTS: Chronic | ICD-10-CM

## 2024-03-12 DIAGNOSIS — Z13.820 ENCOUNTER FOR SCREENING FOR OSTEOPOROSIS: ICD-10-CM

## 2024-03-12 DIAGNOSIS — Z98.891 HISTORY OF UTERINE SCAR FROM PREVIOUS SURGERY: Chronic | ICD-10-CM

## 2024-03-12 DIAGNOSIS — Z98.89 OTHER SPECIFIED POSTPROCEDURAL STATES: Chronic | ICD-10-CM

## 2024-03-12 PROCEDURE — 77063 BREAST TOMOSYNTHESIS BI: CPT

## 2024-03-12 PROCEDURE — 77063 BREAST TOMOSYNTHESIS BI: CPT | Mod: 26

## 2024-03-12 PROCEDURE — 77067 SCR MAMMO BI INCL CAD: CPT

## 2024-03-12 PROCEDURE — 77067 SCR MAMMO BI INCL CAD: CPT | Mod: 26

## 2024-03-19 DIAGNOSIS — M81.0 AGE-RELATED OSTEOPOROSIS W/OUT CURRENT PATHOLOGICAL FRACTURE: ICD-10-CM

## 2024-03-20 ENCOUNTER — NON-APPOINTMENT (OUTPATIENT)
Age: 74
End: 2024-03-20

## 2024-03-24 PROBLEM — M81.0 OSTEOPOROSIS: Status: ACTIVE | Noted: 2024-03-24

## 2024-03-29 ENCOUNTER — APPOINTMENT (OUTPATIENT)
Dept: FAMILY MEDICINE | Facility: CLINIC | Age: 74
End: 2024-03-29
Payer: COMMERCIAL

## 2024-03-29 DIAGNOSIS — M54.50 LOW BACK PAIN, UNSPECIFIED: ICD-10-CM

## 2024-03-29 DIAGNOSIS — I10 ESSENTIAL (PRIMARY) HYPERTENSION: ICD-10-CM

## 2024-03-29 DIAGNOSIS — R31.29 OTHER MICROSCOPIC HEMATURIA: ICD-10-CM

## 2024-03-29 DIAGNOSIS — R73.03 PREDIABETES.: ICD-10-CM

## 2024-03-29 DIAGNOSIS — E78.2 MIXED HYPERLIPIDEMIA: ICD-10-CM

## 2024-03-29 PROCEDURE — 99214 OFFICE O/P EST MOD 30 MIN: CPT

## 2024-03-29 RX ORDER — ALBUTEROL SULFATE 90 UG/1
108 (90 BASE) INHALANT RESPIRATORY (INHALATION) EVERY 4 HOURS
Qty: 1 | Refills: 2 | Status: ACTIVE | COMMUNITY
Start: 2023-07-25 | End: 1900-01-01

## 2024-03-29 RX ORDER — CLOPIDOGREL BISULFATE 75 MG/1
75 TABLET, FILM COATED ORAL
Qty: 90 | Refills: 1 | Status: ACTIVE | COMMUNITY
Start: 2021-01-29 | End: 1900-01-01

## 2024-03-29 RX ORDER — LOSARTAN POTASSIUM 50 MG/1
50 TABLET, FILM COATED ORAL
Qty: 90 | Refills: 0 | Status: ACTIVE | COMMUNITY
Start: 2019-10-30 | End: 1900-01-01

## 2024-03-29 NOTE — ASSESSMENT
[FreeTextEntry1] : Hematuria, microscopic: UA 7/23 reviewed, recommend repeat UA/UCx ordered HLD: Recommend low fat diet, low int exercise, nutritional counseling provided, f/u lipid panel Prediabetes: Recommend low carb diet, low int exercise, f/u a1c ordered HTN: bp reviewed, Recommend low fat diet, low int exercise, and DASH diet, c/w losartan 50 mg po daily Low back pain: mild, no active symptoms, recommend low int exercise/stretching RTC 2 wks

## 2024-03-29 NOTE — HISTORY OF PRESENT ILLNESS
[FreeTextEntry1] : ROSAURA is a 72 y/o female here for a f/u. [de-identified] : 72 yo female presents for follow up. She notes low back pain, thats been going on for months, it is mild, occurs occasionally, sometimes when getting up, she is feeling no pain at this time. Denies fever, chills, cp, palpitations, sob, nvcd.

## 2024-06-12 RX ORDER — ROSUVASTATIN CALCIUM 5 MG/1
5 TABLET, FILM COATED ORAL
Qty: 90 | Refills: 1 | Status: ACTIVE | COMMUNITY
Start: 2024-06-12 | End: 1900-01-01

## 2024-07-15 NOTE — H&P PST ADULT - HISTORY OF PRESENT ILLNESS
36.8 Narrative:     71 yo female with PMHX of RA, PAD, HTN, s/p pop-tib bypass with stent of TP trunk (continues on aspirin and plavix) on 1/3/20 with Dr. Walter c/b poor wound healing requiring wound vac to help with healing process. Patient has been seen by outpatient physical therapy for symptom treatment and improvement of functional mobility. Patient follows with Dr. Walter outpatient. Recent duplex scan 9/8/20 showed popliteal to distal posterior tibial artery vein bypass graft occlusion; low flow velocities. Patient now presents for Right Lower Extremity Angiogram possible Angioplasty / Stent with Dr. Walter.       ASA-2  Mallampati-2  GFR-91  Creat-0.64  Bleeding Risk-1.9%  COVID-19  negative 9/14/20       Symptoms:        Angina (Class):   No        Ischemic Symptoms:   No     Heart Failure:        Systolic/Diastolic/Combined:   No        NYHA Class (within 2 weeks):     Assessment of LVEF (Must be within 6 months):       EF:        Assessed by:        Date:     Prior Cardiac Interventions (LHC, stents, CABG):       PCI's (Date, Stents, Vessels):   No        CABG (Date, Grafts):   No     Noninvasive Testing:   Stress Test: Date:        Protocol:        Duration of Exercise:        Symptoms:        EKG Changes:        DTS:        Myocardial Imaging:        Risk Assessment (Low, Medium, High):       Antianginal Therapies:        Beta Blockers:         Calcium Channel Blockers:        Long Acting Nitrates:        Ranexa:     Associated Risk Factors:        Cerebrovascular Disease: N/A       Chronic Lung Disease: N/A       Peripheral Arterial Disease:   YES        Chronic Kidney Disease (if yes, what is GFR): N/A       Uncontrolled Diabetes (if yes, what is HgbA1C or FBS): N/A       Poorly Controlled Hypertension (if yes, what is SBP): N/A       Morbid Obesity (if yes, what is BMI): N/A       History of Recent Ventricular Arrhythmia: N/A       Inability to Ambulate Safely: N/A       Need for Therapeutic Anticoagulation: N/A       Antiplatelet or Contrast Allergy: N/A Narrative: 72 yo female with PMHX of RA, HTN, RA s/p pop-tib bypass with stent of TP trunk (continues on aspirin and plavix) on 1/3/20 with Dr. Walter present today for pst . pt is s/p RLE angiogram and balloon angioplasty and recannulization fo right pop-PT bypass RSVG on 9/16/20 due to thrombosed bypass and recurrent right foot pain and rest and non healing right great toe ulcer. Patient and  reports great toe has healed and she is doing well on Xarelto daily. She does endorse continued claudication and is unable to walk 200ft. She reports needing to talk frequent breaks.  Arterial duplex demonstrates patent pop-PT bypass with decreased flow throughout and increased velocity appreciated at proximal anastomosis.     9/8/20 showed popliteal to distal posterior tibial artery vein bypass graft occlusion; low flow velocities. Patient now presents for Right Lower Extremity Angiogram possible Angioplasty / Stent with Dr. Walter.     Risk Factors for PAD       Age: 71       DM: N/A       Smoking History: N/A       Hyperlipidemia:        Hypertension: N/A       Family History of PAD: N/A       Known Atherosclerotic Disease (coronary, carotid, subclavian, renal, mesenteric, AAA):     Subjective Complaints:        Claudication Gretel Class:        Impaired Walking Function: N/A       Ischemic Rest Pain: N/A       Non-healing Ulcers       Other Non-Joint Related Exertional Lower Extremity Symptoms (not typical of claudication): N/A    Objective Findings:        Lower Extremity Pulses: Unable to palpate DP pulses, doppler pulses noted.       Nonhealing Lower Extremity Wound/ Ulcers: N/A       Lower Extremity Gangrene: N/A       Vascular Bruit: N/A       Other Suggestive Lower Extremity Findings (elevation pallor, dependent rubor): N/A    Vascular Testing:        MANDI (Normal/Borderline/Abnormal/Noncompressable):        Arterial Dopplers:        CTA/MRA:        Prior Peripheral Angiograms/ Interventions : (Date/ Findings)    Medical Management:       Antiplatelets:        Cilostazol:        Statin:       Gretel Claudication Classification:        I: Asymptomatic       IIa: Walking > 200 meters (2.5 blocks)       IIb: Walking < 200 meters (2.5 blocks)       III: Rest/nocturnal pain       IV: Necrosis/gangrene    Ankle-Brachial Index:       Noncompressable: > 1.4       Normal: 1.0 to 1.4       Borderline: 0.91 to 0.99       Abnormal: < 0.91  72 yo female with PMHX of RA, HTN, RA s/p pop-tib bypass with stent of TP trunk (continues on aspirin and plavix) on 1/3/20 with Dr. Walter present today for pst . pt is s/p RLE angiogram and balloon angioplasty and recannulization fo right pop-PT bypass RSVG on 9/16/20 due to thrombosed bypass and recurrent right foot pain and rest and non healing right great toe ulcer. Patient and  reports great toe has healed and she is doing well on Xarelto daily. She does endorse continued claudication and is unable to walk 200ft. She reports needing to talk frequent breaks.  Arterial duplex demonstrates patent pop-PT bypass with decreased flow throughout and increased velocity appreciated at proximal anastomosis.     9/8/20 showed popliteal to distal posterior tibial artery vein bypass graft occlusion; low flow velocities. Patient now presents for Right Lower Extremity Angiogram possible Angioplasty / Stent with Dr. Walter.         Symptoms:        Angina (Class):   No        Ischemic Symptoms:   No     Heart Failure:        Systolic/Diastolic/Combined:   No        NYHA Class (within 2 weeks):     Assessment of LVEF (Must be within 6 months):       EF:        Assessed by:        Date:     Prior Cardiac Interventions (LHC, stents, CABG):       PCI's (Date, Stents, Vessels):   No        CABG (Date, Grafts):   No     Noninvasive Testing:   Stress Test: Date:        Protocol:        Duration of Exercise:        Symptoms:        EKG Changes:        DTS:        Myocardial Imaging:        Risk Assessment (Low, Medium, High):       Antianginal Therapies:        Beta Blockers:         Calcium Channel Blockers:        Long Acting Nitrates:        Ranexa:     Associated Risk Factors:        Cerebrovascular Disease: N/A       Chronic Lung Disease: N/A       Peripheral Arterial Disease:   YES        Chronic Kidney Disease (if yes, what is GFR): N/A       Uncontrolled Diabetes (if yes, what is HgbA1C or FBS): N/A       Poorly Controlled Hypertension (if yes, what is SBP): N/A       Morbid Obesity (if yes, what is BMI): N/A       History of Recent Ventricular Arrhythmia: N/A       Inability to Ambulate Safely: N/A       Need for Therapeutic Anticoagulation: N/A       Antiplatelet or Contrast Allergy: N/A Narrative: 72 yo female with PMHX of RA, HTN, RA s/p pop-tib bypass with stent of TP trunk (continues on aspirin and plavix) on 1/3/20 with Dr. Walter present today for pst . pt is s/p RLE angiogram and balloon angioplasty and recannulization fo right pop-PT bypass RSVG on 9/16/20 due to thrombosed bypass and recurrent right foot pain and rest and non healing right great toe ulcer. Patient and  reports great toe has healed and she is doing well on Xarelto daily. She does endorse continued claudication and is unable to walk 200ft. She reports needing to talk frequent breaks.  She also reports trouble sleeping at night due to  leg and back pain.  Reports temporary relief with Nsaids  but both pain would return. Arterial duplex demonstrates patent pop-PT bypass with decreased flow throughout and increased velocity appreciated at proximal anastomosis.     9/8/20 showed popliteal to distal posterior tibial artery vein bypass graft occlusion; low flow velocities. Patient now presents for Right Lower Extremity Angiogram possible Angioplasty / Stent with Dr. Walter.     Risk Factors for PAD       Age: 71       DM: NO       Smoking History: NO       Hyperlipidemia: YES       Hypertension: YES       Family History of PAD: N/A       Known Atherosclerotic Disease (coronary, carotid, subclavian, renal, mesenteric, AAA): NO    Subjective Complaints:        Claudication Gretel Class:III       Impaired Walking Function: YES       Ischemic Rest Pain: YES       Non-healing Ulcers  yes prior hx Ulcer has been present for over 5 months and has been treated with regular wound care by podiatry and fem- TP pt now report ulcer has healed       Other Non-Joint Related Exertional Lower Extremity Symptoms (not typical of claudication): RIGHT KNEE PAIN/ SCIATICA     Objective Findings:        Lower Extremity Pulses: Unable to palpate pop, TD,DP pulses, doppler pulses noted.       Nonhealing Lower Extremity Wound/ Ulcers: prior but now has since healed        Lower Extremity Gangrene: N/A       Vascular Bruit: N/A       Other Suggestive Lower Extremity Findings (elevation pallor, dependent rubor): lower legs discoloration, dependent rubor color on  skin     Vascular Testing:        MANDI (Normal/Borderline/Abnormal/Noncompressable): abnormal/ absent        Arterial Dopplers: 9/8/20       CTA/MRA:        Prior Peripheral Angiograms/ Interventions : (Date/ Findings)    Medical Management:       Antiplatelets: ASA, Plavix        Cilostazol: N/A       Statin: simvastatin       Gretel Claudication Classification:        I: Asymptomatic       IIa: Walking > 200 meters (2.5 blocks)       IIb: Walking < 200 meters (2.5 blocks)       III: Rest/nocturnal pain       IV: Necrosis/gangrene    Ankle-Brachial Index:       Noncompressable: > 1.4       Normal: 1.0 to 1.4       Borderline: 0.91 to 0.99       Abnormal: < 0.91

## 2024-10-07 NOTE — PATIENT PROFILE ADULT - DO YOU FEEL UNSAFE AT SCHOOL?
What Type Of Note Output Would You Prefer (Optional)?: Bullet Format How Severe Is Your Skin Lesion?: mild Is This A New Presentation, Or A Follow-Up?: Skin Lesion not applicable

## 2025-01-24 NOTE — PATIENT PROFILE ADULT - DO YOU FEEL UNSAFE AT HOME, WORK, OR SCHOOL?
We will plan for an EGD (with biopsies for celiac disease) as the next step given her ongoing symptoms without improvement with PPI use.
I have also recommended that she go back to taking OTC Miralax daily but to take it consistently.
no

## 2025-02-04 NOTE — H&P PST ADULT - TEMPERATURE IN CELSIUS (DEGREES C)
36.4 I will SWITCH the dose or number of times a day I take the medications listed below when I get home from the hospital:  None